# Patient Record
Sex: FEMALE | Race: WHITE | URBAN - METROPOLITAN AREA
[De-identification: names, ages, dates, MRNs, and addresses within clinical notes are randomized per-mention and may not be internally consistent; named-entity substitution may affect disease eponyms.]

---

## 2021-11-22 ENCOUNTER — OFFICE VISIT (OUTPATIENT)
Dept: NEUROLOGY | Facility: MEDICAL CENTER | Age: 46
End: 2021-11-22
Attending: PSYCHIATRY & NEUROLOGY
Payer: COMMERCIAL

## 2021-11-22 VITALS
WEIGHT: 156.97 LBS | OXYGEN SATURATION: 97 % | HEIGHT: 62 IN | SYSTOLIC BLOOD PRESSURE: 148 MMHG | DIASTOLIC BLOOD PRESSURE: 98 MMHG | RESPIRATION RATE: 12 BRPM | HEART RATE: 86 BPM | TEMPERATURE: 97.3 F | BODY MASS INDEX: 28.89 KG/M2

## 2021-11-22 DIAGNOSIS — E28.2 PCOS (POLYCYSTIC OVARIAN SYNDROME): ICD-10-CM

## 2021-11-22 DIAGNOSIS — N80.9 ENDOMETRIOSIS: ICD-10-CM

## 2021-11-22 DIAGNOSIS — I10 HYPERTENSION, UNSPECIFIED TYPE: ICD-10-CM

## 2021-11-22 DIAGNOSIS — K58.0 IRRITABLE BOWEL SYNDROME WITH DIARRHEA: ICD-10-CM

## 2021-11-22 DIAGNOSIS — G43.111 INTRACTABLE MIGRAINE WITH AURA WITH STATUS MIGRAINOSUS: ICD-10-CM

## 2021-11-22 DIAGNOSIS — F32.9 REACTIVE DEPRESSION: ICD-10-CM

## 2021-11-22 DIAGNOSIS — M32.8 OTHER FORMS OF SYSTEMIC LUPUS ERYTHEMATOSUS, UNSPECIFIED ORGAN INVOLVEMENT STATUS (HCC): ICD-10-CM

## 2021-11-22 DIAGNOSIS — N32.81 OVERACTIVE BLADDER: ICD-10-CM

## 2021-11-22 DIAGNOSIS — G35 MULTIPLE SCLEROSIS (HCC): ICD-10-CM

## 2021-11-22 PROBLEM — K58.2 IRRITABLE BOWEL SYNDROME WITH BOTH CONSTIPATION AND DIARRHEA: Status: ACTIVE | Noted: 2021-11-22

## 2021-11-22 PROCEDURE — 99212 OFFICE O/P EST SF 10 MIN: CPT | Performed by: PSYCHIATRY & NEUROLOGY

## 2021-11-22 PROCEDURE — 99205 OFFICE O/P NEW HI 60 MIN: CPT | Performed by: PSYCHIATRY & NEUROLOGY

## 2021-11-22 RX ORDER — DEXTROAMPHETAMINE SACCHARATE, AMPHETAMINE ASPARTATE, DEXTROAMPHETAMINE SULFATE AND AMPHETAMINE SULFATE 1.25; 1.25; 1.25; 1.25 MG/1; MG/1; MG/1; MG/1
5 TABLET ORAL 2 TIMES DAILY
Qty: 60 TABLET | Refills: 0 | Status: SHIPPED | OUTPATIENT
Start: 2021-11-22 | End: 2021-11-29 | Stop reason: SDUPTHER

## 2021-11-22 RX ORDER — RENAGEL 800 MG/1
TABLET ORAL
COMMUNITY
Start: 2019-01-01 | End: 2022-08-08 | Stop reason: SDUPTHER

## 2021-11-22 RX ORDER — TIZANIDINE 2 MG/1
TABLET ORAL
COMMUNITY
Start: 2020-01-01 | End: 2022-02-07

## 2021-11-22 RX ORDER — SUMATRIPTAN 100 MG/1
100 TABLET, FILM COATED ORAL
Qty: 10 TABLET | Refills: 5 | Status: SHIPPED | OUTPATIENT
Start: 2021-11-22 | End: 2021-12-22

## 2021-11-22 RX ORDER — SUMATRIPTAN 100 MG/1
TABLET, FILM COATED ORAL
COMMUNITY
Start: 2019-01-01 | End: 2021-11-22 | Stop reason: SDUPTHER

## 2021-11-22 RX ORDER — TOPIRAMATE SPINKLE 25 MG/1
25 CAPSULE ORAL 2 TIMES DAILY
Qty: 60 CAPSULE | Refills: 3 | Status: SHIPPED | OUTPATIENT
Start: 2021-11-22 | End: 2022-06-07

## 2021-11-22 RX ORDER — TOPIRAMATE SPINKLE 25 MG/1
25 CAPSULE ORAL 2 TIMES DAILY
Qty: 60 CAPSULE | Refills: 3 | Status: SHIPPED | OUTPATIENT
Start: 2021-11-22 | End: 2022-04-15

## 2021-11-22 RX ORDER — FLUOXETINE HYDROCHLORIDE 40 MG/1
CAPSULE ORAL
COMMUNITY
Start: 2017-09-01 | End: 2022-02-07 | Stop reason: SDUPTHER

## 2021-11-22 RX ORDER — HYDROXYZINE PAMOATE 25 MG/1
CAPSULE ORAL
COMMUNITY
End: 2022-06-07 | Stop reason: SDUPTHER

## 2021-11-22 RX ORDER — ONDANSETRON 4 MG/1
TABLET, FILM COATED ORAL
COMMUNITY
Start: 2017-09-01 | End: 2022-06-07

## 2021-11-22 RX ORDER — TIZANIDINE 4 MG/1
4 TABLET ORAL EVERY 6 HOURS PRN
Qty: 30 TABLET | Refills: 3 | Status: SHIPPED | OUTPATIENT
Start: 2021-11-22 | End: 2022-03-22

## 2021-11-22 RX ORDER — SODIUM FLUORIDE 5 MG/G
GEL, DENTIFRICE DENTAL
COMMUNITY
Start: 2021-09-01 | End: 2023-12-19

## 2021-11-22 ASSESSMENT — PATIENT HEALTH QUESTIONNAIRE - PHQ9: CLINICAL INTERPRETATION OF PHQ2 SCORE: 0

## 2021-11-22 NOTE — ASSESSMENT & PLAN NOTE
Pt has had some recent increase in BP and she has had some weight gain. She has gained 10 pounds because of many life changes. Pt is eating less and she still is gaining weight.

## 2021-11-22 NOTE — ASSESSMENT & PLAN NOTE
Pt states she had rashes on her cheeks and she gets lesions in her eyes and she takes eye drops. Pt states she gets joint pain in her right elbow and her thumb joint pain and swelling. Pt has swelling her toes.
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
## 2021-11-22 NOTE — ASSESSMENT & PLAN NOTE
Pt started in her late teens. Pt was in the injectable imitrex study. Pt tries not to take it too much. She starts with advil. Pt feels she is immune to the triptan. Pt states that she has some left eye pain. Pt states she has HA on both sides. Pt has 3 cups of coffee a day. Pt states she wants to try topamax as they may have helped in the past for about 2 weeks.

## 2021-11-23 NOTE — ASSESSMENT & PLAN NOTE
Pt has had symptoms of diarrhea and she has had issues with this for a long time. She has had a spastic colon and she has had some incontinence.

## 2021-11-23 NOTE — ASSESSMENT & PLAN NOTE
Pt states that she has had some issues and she did some pelvic floor PT. Pt had chronic UTI in the past.

## 2021-11-23 NOTE — ASSESSMENT & PLAN NOTE
Pt states that she had symptoms at age 14 when her period started and she states she had a hysterectomy when she was 2013. Pt states she had a lot of relief since the hysterectomy.
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
## 2021-11-29 DIAGNOSIS — G35 MULTIPLE SCLEROSIS (HCC): ICD-10-CM

## 2021-11-29 RX ORDER — DEXTROAMPHETAMINE SACCHARATE, AMPHETAMINE ASPARTATE, DEXTROAMPHETAMINE SULFATE AND AMPHETAMINE SULFATE 1.25; 1.25; 1.25; 1.25 MG/1; MG/1; MG/1; MG/1
5 TABLET ORAL 2 TIMES DAILY
Qty: 60 TABLET | Refills: 0 | Status: SHIPPED | OUTPATIENT
Start: 2021-11-29 | End: 2021-12-29

## 2021-12-13 ENCOUNTER — OFFICE VISIT (OUTPATIENT)
Dept: URGENT CARE | Facility: CLINIC | Age: 46
End: 2021-12-13
Payer: COMMERCIAL

## 2021-12-13 ENCOUNTER — APPOINTMENT (OUTPATIENT)
Dept: RADIOLOGY | Facility: IMAGING CENTER | Age: 46
End: 2021-12-13
Attending: STUDENT IN AN ORGANIZED HEALTH CARE EDUCATION/TRAINING PROGRAM
Payer: COMMERCIAL

## 2021-12-13 VITALS
WEIGHT: 154 LBS | BODY MASS INDEX: 28.34 KG/M2 | DIASTOLIC BLOOD PRESSURE: 96 MMHG | SYSTOLIC BLOOD PRESSURE: 136 MMHG | RESPIRATION RATE: 20 BRPM | HEART RATE: 97 BPM | OXYGEN SATURATION: 97 % | HEIGHT: 62 IN | TEMPERATURE: 97.8 F

## 2021-12-13 DIAGNOSIS — R05.9 COUGH: ICD-10-CM

## 2021-12-13 DIAGNOSIS — I10 HYPERTENSION, UNSPECIFIED TYPE: ICD-10-CM

## 2021-12-13 DIAGNOSIS — J98.01 BRONCHOSPASM: ICD-10-CM

## 2021-12-13 DIAGNOSIS — J06.9 VIRAL URI WITH COUGH: ICD-10-CM

## 2021-12-13 PROCEDURE — 71046 X-RAY EXAM CHEST 2 VIEWS: CPT | Mod: TC,FY | Performed by: RADIOLOGY

## 2021-12-13 PROCEDURE — 99204 OFFICE O/P NEW MOD 45 MIN: CPT | Performed by: STUDENT IN AN ORGANIZED HEALTH CARE EDUCATION/TRAINING PROGRAM

## 2021-12-13 RX ORDER — ALBUTEROL SULFATE 90 UG/1
2 AEROSOL, METERED RESPIRATORY (INHALATION) EVERY 6 HOURS PRN
Qty: 8.5 G | Refills: 0 | Status: SHIPPED | OUTPATIENT
Start: 2021-12-13 | End: 2022-02-07

## 2021-12-13 RX ORDER — CETIRIZINE HYDROCHLORIDE 10 MG/1
10 TABLET ORAL DAILY
Qty: 30 TABLET | Refills: 1 | Status: SHIPPED | OUTPATIENT
Start: 2021-12-13 | End: 2022-08-18 | Stop reason: SDUPTHER

## 2021-12-13 RX ORDER — INHALER, ASSIST DEVICES
1 SPACER (EA) MISCELLANEOUS ONCE
Qty: 1 EACH | Refills: 0 | Status: SHIPPED | OUTPATIENT
Start: 2021-12-13 | End: 2021-12-13

## 2021-12-13 RX ORDER — MOMETASONE FUROATE 50 UG/1
2 SPRAY, METERED NASAL
Qty: 1 EACH | Refills: 1 | Status: SHIPPED | OUTPATIENT
Start: 2021-12-13 | End: 2022-02-07

## 2021-12-13 NOTE — PROGRESS NOTES
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 female who presents with a chief complaint of dry cough, runny nose, sinus headache and bilateral earache which started 1 week ago.  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  She has tried NyQuil and DayQuil which did not help.  Positive ROS for wheezing and shortness of breath.  No fevers, chills or body aches.  PMH of pneumonia.  No history of asthma.  She is not immunized against Covid.  She has had a negative rapid and negative PCR test since onset of symptoms.    REVIEW OF SYSTEMS  General: no fever or chills  GI: no nausea or vomiting  See HPI for further details.    PAST MEDICAL HISTORY  Patient Active Problem List    Diagnosis Date Noted   • Multiple sclerosis (HCC) 11/22/2021   • Intractable migraine with aura with status migrainosus 11/22/2021   • Hypertension 11/22/2021   • PCOS (polycystic ovarian syndrome) 11/22/2021   • Endometriosis 11/22/2021   • Other forms of systemic lupus erythematosus (HCC) 11/22/2021   • Irritable bowel syndrome with diarrhea 11/22/2021   • Overactive bladder 11/22/2021   • Reactive depression 11/22/2021       SURGICAL HISTORY   has a past surgical history that includes abdominal hysterectomy total (2013) and appendectomy (1998).    ALLERGIES  Allergies   Allergen Reactions   • Ampicillin    • Erythromycin    • Hydrocodone-Acetaminophen    • Tape    • Tramadol        CURRENT MEDICATIONS  Home Medications    **Home medications have not yet been reviewed for this encounter**         SOCIAL HISTORY  Social History     Tobacco Use   •  "Smoking status: Former Smoker     Quit date:      Years since quittin.9   • Smokeless tobacco: Never Used   Vaping Use   • Vaping Use: Every day   • Substances: THC   Substance and Sexual Activity   • Alcohol use: Yes     Comment: 4-5 per week   • Drug use: Yes     Types: Marijuana   • Sexual activity: Not on file       FAMILY HISTORY  Family History   Problem Relation Age of Onset   • Hypertension Mother    • Hyperlipidemia Mother    • Lupus Paternal Grandmother           Objective:   PHYSICAL EXAM  VITAL SIGNS: /96   Pulse 97   Temp 36.6 °C (97.8 °F) (Temporal)   Resp 20   Ht 1.575 m (5' 2\")   Wt 69.9 kg (154 lb)   SpO2 97%   Breastfeeding No   BMI 28.17 kg/m²     Gen: no acute distress, normal voice  Skin: dry, intact, moist mucosal membranes  ENT: Bilateral TMs mildly erythematous but intact.  Lungs: Lungs were tight with inspiration but good air intake.  No wheezing, rhonchi or crackles.    CV: RRR w/o murmurs or clicks  Psych: normal affect, normal judgement, alert, awake      RADIOLOGY RESULTS   DX-CHEST-2 VIEWS    Result Date: 2021 12:53 PM HISTORY/REASON FOR EXAM:  Cough. TECHNIQUE/EXAM DESCRIPTION AND NUMBER OF VIEWS: Two views of the chest. COMPARISON:  None. FINDINGS: LUNGS: The lungs are clear. HEART and MEDIASTINUM: normal in size. Pleura: There are no pleural effusion or pneumothoraces. Osseous structures: No significant bony abnormality.     Negative two views of the chest.             Assessment/Plan:     1. Cough  DX-CHEST-2 VIEWS   2. Bronchospasm     3. Viral URI with cough     1-3) suspect symptoms secondary to underlying viral URI with subsequent bronchospasms.  Patient is not immunized against Covid but has had 2 tests this week.  She does report a previous history of PNA and a colleague of hers at work said he heard crackles on examination so I ordered a chest x-ray which was unremarkable.  Patient's lungs were clear to auscultation on my examination.  " She is afebrile with an oxygen saturation 97%.  Discussed trial symptomatic treatment and return precautions  -Ordered Nasonex  -Ordered Zyrtec  -Ordered albuterol  -If symptoms worsen or do not improve follow-up in urgent care    4) blood pressure elevated today at 136/96.  Likely secondary to above.  Follow-up with primary care for reevaluation continue management.        Differential diagnosis, natural history, supportive care, and indications for immediate follow-up discussed. All questions answered. Patient agrees with the plan of care.    Follow-up as needed if symptoms worsen or fail to improve to PCP, Urgent care or Emergency Room.    Please note that this dictation was created using voice recognition software. I have made a reasonable attempt to correct obvious errors, but I expect that there are errors of grammar and possibly content that I did not discover before finalizing the note.

## 2021-12-21 DIAGNOSIS — G43.111 INTRACTABLE MIGRAINE WITH AURA WITH STATUS MIGRAINOSUS: ICD-10-CM

## 2021-12-22 RX ORDER — SUMATRIPTAN 100 MG/1
TABLET, FILM COATED ORAL
Qty: 10 TABLET | Refills: 5 | Status: SHIPPED | OUTPATIENT
Start: 2021-12-22 | End: 2022-06-07 | Stop reason: SDUPTHER

## 2022-02-07 ENCOUNTER — OFFICE VISIT (OUTPATIENT)
Dept: NEUROLOGY | Facility: MEDICAL CENTER | Age: 47
End: 2022-02-07
Attending: PSYCHIATRY & NEUROLOGY
Payer: COMMERCIAL

## 2022-02-07 VITALS
WEIGHT: 154 LBS | HEART RATE: 76 BPM | TEMPERATURE: 97.7 F | SYSTOLIC BLOOD PRESSURE: 134 MMHG | BODY MASS INDEX: 28.34 KG/M2 | DIASTOLIC BLOOD PRESSURE: 82 MMHG | OXYGEN SATURATION: 97 % | HEIGHT: 62 IN

## 2022-02-07 DIAGNOSIS — G43.111 INTRACTABLE MIGRAINE WITH AURA WITH STATUS MIGRAINOSUS: ICD-10-CM

## 2022-02-07 DIAGNOSIS — G35 MULTIPLE SCLEROSIS (HCC): ICD-10-CM

## 2022-02-07 PROCEDURE — 99214 OFFICE O/P EST MOD 30 MIN: CPT | Performed by: PSYCHIATRY & NEUROLOGY

## 2022-02-07 PROCEDURE — 99212 OFFICE O/P EST SF 10 MIN: CPT | Performed by: PSYCHIATRY & NEUROLOGY

## 2022-02-07 RX ORDER — FLUOXETINE HYDROCHLORIDE 40 MG/1
CAPSULE ORAL
Qty: 90 CAPSULE | Refills: 3 | Status: SHIPPED | OUTPATIENT
Start: 2022-02-07 | End: 2022-06-07 | Stop reason: SDUPTHER

## 2022-02-07 RX ORDER — DEXTROAMPHETAMINE SACCHARATE, AMPHETAMINE ASPARTATE MONOHYDRATE, DEXTROAMPHETAMINE SULFATE AND AMPHETAMINE SULFATE 3.75; 3.75; 3.75; 3.75 MG/1; MG/1; MG/1; MG/1
15 CAPSULE, EXTENDED RELEASE ORAL EVERY MORNING
Qty: 30 CAPSULE | Refills: 0 | Status: SHIPPED | OUTPATIENT
Start: 2022-02-07 | End: 2022-03-15 | Stop reason: SDUPTHER

## 2022-02-07 NOTE — ASSESSMENT & PLAN NOTE
Pt has daily migraine headache for the last 2 weeks.  We discussed treatment options and the medication she is on we also discussed environmental factors for her headaches.  She has recently started the Metformin which could be related.

## 2022-02-08 NOTE — ASSESSMENT & PLAN NOTE
Pt with several episodes of feeling paresthesias throughout her body and face.  We reviewed recent MRI scans of the brain and the spine and none of the lesions correlate with that symptom.  And concerned that it could be medication side effect.

## 2022-02-08 NOTE — PROGRESS NOTES
Chief Complaint   Patient presents with   • Follow-Up     MS        Problem List Items Addressed This Visit     Multiple sclerosis (HCC)     Pt with several episodes of feeling paresthesias throughout her body and face.  We reviewed recent MRI scans of the brain and the spine and none of the lesions correlate with that symptom.  And concerned that it could be medication side effect.         Relevant Medications    amphetamine-dextroamphetamine (ADDERALL XR, 15MG,) 15 MG XR capsule    fluoxetine (PROZAC) 40 MG capsule    Intractable migraine with aura with status migrainosus     Pt has daily migraine headache for the last 2 weeks.  We discussed treatment options and the medication she is on we also discussed environmental factors for her headaches.  She has recently started the Metformin which could be related.         Relevant Medications    fluoxetine (PROZAC) 40 MG capsule          History of present illness:  Della Mata 46 y.o. female presents today for treatment of migraines and multiple sclerosis.    Past medical history:   Past Medical History:   Diagnosis Date   • Head ache     migraines   • Lupus (HCC)    • Multiple sclerosis (HCC)        Past surgical history:   Past Surgical History:   Procedure Laterality Date   • ABDOMINAL HYSTERECTOMY TOTAL     • APPENDECTOMY         Family history:   Family History   Problem Relation Age of Onset   • Hypertension Mother    • Hyperlipidemia Mother    • Lupus Paternal Grandmother        Social history:   Social History     Socioeconomic History   • Marital status: Other     Spouse name: Not on file   • Number of children: Not on file   • Years of education: Not on file   • Highest education level: Not on file   Occupational History   • Not on file   Tobacco Use   • Smoking status: Former Smoker     Quit date:      Years since quittin.1   • Smokeless tobacco: Never Used   Vaping Use   • Vaping Use: Every day   • Substances: THC   Substance and Sexual  Activity   • Alcohol use: Yes     Comment: 4-5 per week   • Drug use: Yes     Types: Marijuana   • Sexual activity: Not on file   Other Topics Concern   • Not on file   Social History Narrative   • Not on file     Social Determinants of Health     Financial Resource Strain:    • Difficulty of Paying Living Expenses: Not on file   Food Insecurity:    • Worried About Running Out of Food in the Last Year: Not on file   • Ran Out of Food in the Last Year: Not on file   Transportation Needs:    • Lack of Transportation (Medical): Not on file   • Lack of Transportation (Non-Medical): Not on file   Physical Activity:    • Days of Exercise per Week: Not on file   • Minutes of Exercise per Session: Not on file   Stress:    • Feeling of Stress : Not on file   Social Connections:    • Frequency of Communication with Friends and Family: Not on file   • Frequency of Social Gatherings with Friends and Family: Not on file   • Attends Yarsani Services: Not on file   • Active Member of Clubs or Organizations: Not on file   • Attends Club or Organization Meetings: Not on file   • Marital Status: Not on file   Intimate Partner Violence:    • Fear of Current or Ex-Partner: Not on file   • Emotionally Abused: Not on file   • Physically Abused: Not on file   • Sexually Abused: Not on file   Housing Stability:    • Unable to Pay for Housing in the Last Year: Not on file   • Number of Places Lived in the Last Year: Not on file   • Unstable Housing in the Last Year: Not on file       Current medications:   Current Outpatient Medications   Medication   • metFORMIN (GLUCOPHAGE) 500 MG Tab   • amphetamine-dextroamphetamine (ADDERALL XR, 15MG,) 15 MG XR capsule   • fluoxetine (PROZAC) 40 MG capsule   • sumatriptan (IMITREX) 100 MG tablet   • cetirizine (ZYRTEC) 10 MG Tab   • hydrOXYzine pamoate (VISTARIL) 25 MG Cap   • ondansetron (ZOFRAN) 4 MG Tab tablet   • PREVIDENT 1.1 % Gel   • Teriflunomide (AUBAGIO) 14 MG Tab   • tizanidine (ZANAFLEX)  4 MG Tab   • topiramate (TOPAMAX) 25 MG capsule   • topiramate (TOPAMAX) 25 MG capsule     No current facility-administered medications for this visit.       Medication Allergy:  Allergies   Allergen Reactions   • Ampicillin    • Erythromycin    • Hydrocodone-Acetaminophen    • Tape    • Tramadol        Review of systems:   Constitutional: denies fever, night sweats, weight loss.   Eyes: denies acute vision change, eye pain or secretion.   Ears, Nose, Mouth, Throat: denies nasal secretion, nasal bleeding, difficulty swallowing, hearing loss, tinnitus, vertigo, ear pain, acute dental problems, oral ulcers or lesions.   Endocrine: denies recent weight changes, heat or cold intolerance, polyuria, polydypsia, polyphagia,abnormal hair growth.  Cardiovascular: denies new onset of chest pain, palpitations, syncope, or dyspnea of exertion.  Pulmonary: denies shortness of breath, new onset of cough, hemoptysis, wheezing, chest pain or flu-like symptoms.   GI: denies nausea, vomiting, diarrhea, GI bleeding, change in appetite, abdominal pain, and change in bowel habits.  : denies dysuria, urinary incontinence, hematuria.  Heme/oncology: denies history of easy bruising or bleeding. No history of cancer, DVTor PE.  Allergy/immunology: denies hives/urticaria, or itching.   Dermatologic: denies new rash, or new skin lesions.  Musculoskeletal:denies joint swelling or pain, muscle pain, neck and back pain. Neurologic: denies headaches, acute visual changes, facial droopiness, muscle weakness (focal or generalized), paresthesias, anesthesia, ataxia, change in speech or language, memory loss, abnormal movements, seizures, loss of consciousness, or episodes of confusion.   Psychiatric: denies symptoms of depression, anxiety, hallucinations, mood swings or changes, suicidal or homicidal thoughts.     Physical examination:   Vitals:    02/07/22 1525   BP: 134/82   BP Location: Right arm   Patient Position: Sitting   BP Cuff Size:  "Adult   Pulse: 76   Temp: 36.5 °C (97.7 °F)   TempSrc: Temporal   SpO2: 97%   Weight: 69.9 kg (154 lb)   Height: 1.575 m (5' 2\")     General: Patient in no acute distress, pleasant and cooperative.  HEENT: Normocephalic, no signs of acute trauma.   Neck: supple, no meningeal signs or carotid bruits. There is normal range of motion. No tenderness on exam.   Chest: clear to auscultation. No cough.   CV: RRR, no murmurs.   Skin: no signs of acute rashes or trauma.   Musculoskeletal: joints exhibit full range of motion, without any pain to palpation. There are no signs of joint or muscle swelling. There is no tenderness to deep palpation of muscles.   Psychiatric: No hallucinatory behavior. Denies symptoms of depression or suicidal ideation. Mood and affect appear normal on exam.     NEUROLOGICAL EXAM:   Mental status, orientation: Awake, alert and fully oriented.   Speech and language: speech is clear and fluent. The patient is able to name, repeat and comprehend.   Memory: There is intact recollection of recent and remote events.   Cranial nerve exam: Pupils are 3-4 mm bilaterally and equally reactive to light and accommodation. Visual fields are intact by confrontation. Fundoscopic exam was unremarkable. There is no nystagmus on primary or secondary gaze. Intact full EOM in all directions of gaze. Face appears symmetric. Sensation in the face is intact to light touch. Uvula is midline. Palate elevates symmetrically. Tongue is midline and without any signs of tongue biting or fasciculations. Sternocleidomastoid muscles exhibit is normal strength bilaterally. Shoulder shrug is intact bilaterally.   Motor exam: Strength is 5/5 in all extremities. Tone is normal. No abnormal movements were seen on exam.   Sensory exam reveals normal sense of light touch, proprioception, vibration and pinprick in all extremities.   Deep tendon reflexes:  2+ throughout. Plantar responses are flexor. There is no clonus.   Coordination: " shows a normal finger-nose-finger. Normal rapidly alternating movements.   Gait: The patient was able to get up from seated position on first attempt without requiring assistance. Found to be steady when walking. Movements were fluid with normal arm swing. The patient was able to turn without difficulties or tendency to fall. Romberg examination positive      ANCILLARY DATA REVIEWED:     Lab Data Review:  No results found for this or any previous visit (from the past 24 hour(s)).    Records reviewed: I reviewed laboratory studies done which did show high AST and high ALT      Imaging: Reviewed last imaging studies done at [facility].          ASSESSMENT AND PLAN:    1. Intractable migraine with aura with status migrainosus  Plan to stop the Metformin refer to Rhonda Mckeon at [facility] for treatment of headaches with manual PT.    2. Multiple sclerosis (HCC)  Plan to try long-acting amphetamine.  Patient is going to keep a calendar of any new events that could be associated with MS versus medication side effect.  Following medications filled for MS as below.  Plan to continue Aubagio and patient will be due for labs but I would like her gastroenterologist evaluation to be done prior to ordering these new labs.  We discussed importance of diet and exercise in relationship to both her liver function and her multiple sclerosis.  - amphetamine-dextroamphetamine (ADDERALL XR, 15MG,) 15 MG XR capsule; Take 1 Capsule by mouth every morning for 30 days.  Dispense: 30 Capsule; Refill: 0  - fluoxetine (PROZAC) 40 MG capsule; fluoxetine 40 mg capsule daily  Dispense: 90 Capsule; Refill: 3        FOLLOW-UP:   No follow-ups on file.      My total time spent caring for the patient on the day of the encounter was 38 minutes.   This does not include time spent on separately billable procedures/tests.  EDUCATION AND COUNSELING:  -Discussed regular exercise program and prevention of cardiovascular disease, including stroke.    -Discussed healthy lifestyle, including: healthy diet (rich in fruits, vegetables, nuts and healthy oils); proper hydration, and adequate sleep hygiene (allowing 7-8 hrs of overnight sleep).        Melissa Bloch, MD  Clinical  of Neurology [facility].   Diplomate in Neurology.   Office: [phone]  Fax: [phone]

## 2022-03-15 DIAGNOSIS — G35 MULTIPLE SCLEROSIS (HCC): ICD-10-CM

## 2022-03-15 RX ORDER — DEXTROAMPHETAMINE SACCHARATE, AMPHETAMINE ASPARTATE MONOHYDRATE, DEXTROAMPHETAMINE SULFATE AND AMPHETAMINE SULFATE 3.75; 3.75; 3.75; 3.75 MG/1; MG/1; MG/1; MG/1
15 CAPSULE, EXTENDED RELEASE ORAL EVERY MORNING
Qty: 30 CAPSULE | Refills: 0 | Status: SHIPPED | OUTPATIENT
Start: 2022-03-15 | End: 2022-03-16 | Stop reason: SDUPTHER

## 2022-03-15 NOTE — TELEPHONE ENCOUNTER
Received request via: Pharmacy    Was the patient seen in the last year in this department? Yes    LV  2/7/22  [facility]  6/7/22    Does the patient have an active prescription (recently filled or refills available) for medication(s) requested? yes

## 2022-03-16 DIAGNOSIS — G35 MULTIPLE SCLEROSIS (HCC): ICD-10-CM

## 2022-03-16 RX ORDER — DEXTROAMPHETAMINE SACCHARATE, AMPHETAMINE ASPARTATE MONOHYDRATE, DEXTROAMPHETAMINE SULFATE AND AMPHETAMINE SULFATE 3.75; 3.75; 3.75; 3.75 MG/1; MG/1; MG/1; MG/1
15 CAPSULE, EXTENDED RELEASE ORAL EVERY MORNING
Qty: 30 CAPSULE | Refills: 0 | Status: SHIPPED | OUTPATIENT
Start: 2022-03-16 | End: 2022-04-20 | Stop reason: SDUPTHER

## 2022-03-21 DIAGNOSIS — G35 MULTIPLE SCLEROSIS (HCC): ICD-10-CM

## 2022-03-22 RX ORDER — TIZANIDINE 4 MG/1
4 TABLET ORAL EVERY 6 HOURS PRN
Qty: 30 TABLET | Refills: 3 | Status: SHIPPED | OUTPATIENT
Start: 2022-03-22 | End: 2022-06-07 | Stop reason: SDUPTHER

## 2022-04-14 DIAGNOSIS — G43.111 INTRACTABLE MIGRAINE WITH AURA WITH STATUS MIGRAINOSUS: ICD-10-CM

## 2022-04-15 RX ORDER — TOPIRAMATE SPINKLE 25 MG/1
CAPSULE ORAL
Qty: 60 CAPSULE | Refills: 3 | Status: SHIPPED | OUTPATIENT
Start: 2022-04-15 | End: 2022-06-07

## 2022-04-20 DIAGNOSIS — G35 MULTIPLE SCLEROSIS (HCC): ICD-10-CM

## 2022-04-21 RX ORDER — DEXTROAMPHETAMINE SACCHARATE, AMPHETAMINE ASPARTATE MONOHYDRATE, DEXTROAMPHETAMINE SULFATE AND AMPHETAMINE SULFATE 3.75; 3.75; 3.75; 3.75 MG/1; MG/1; MG/1; MG/1
15 CAPSULE, EXTENDED RELEASE ORAL EVERY MORNING
Qty: 30 CAPSULE | Refills: 0 | Status: SHIPPED | OUTPATIENT
Start: 2022-04-21 | End: 2022-05-18 | Stop reason: SDUPTHER

## 2022-05-18 DIAGNOSIS — G35 MULTIPLE SCLEROSIS (HCC): ICD-10-CM

## 2022-05-24 RX ORDER — DEXTROAMPHETAMINE SACCHARATE, AMPHETAMINE ASPARTATE MONOHYDRATE, DEXTROAMPHETAMINE SULFATE AND AMPHETAMINE SULFATE 3.75; 3.75; 3.75; 3.75 MG/1; MG/1; MG/1; MG/1
15 CAPSULE, EXTENDED RELEASE ORAL EVERY MORNING
Qty: 30 CAPSULE | Refills: 0 | Status: SHIPPED | OUTPATIENT
Start: 2022-05-24 | End: 2022-06-21 | Stop reason: SDUPTHER

## 2022-05-24 NOTE — TELEPHONE ENCOUNTER
Received request via: Pharmacy    Was the patient seen in the last year in this department? Yes    LV: 2/7/22   [facility]: 6/7/22    Does the patient have an active prescription (recently filled or refills available) for medication(s) requested? No

## 2022-06-07 ENCOUNTER — OFFICE VISIT (OUTPATIENT)
Dept: NEUROLOGY | Facility: MEDICAL CENTER | Age: 47
End: 2022-06-07
Attending: PSYCHIATRY & NEUROLOGY
Payer: COMMERCIAL

## 2022-06-07 VITALS
OXYGEN SATURATION: 99 % | HEART RATE: 98 BPM | RESPIRATION RATE: 14 BRPM | BODY MASS INDEX: 27.75 KG/M2 | DIASTOLIC BLOOD PRESSURE: 80 MMHG | WEIGHT: 150.79 LBS | HEIGHT: 62 IN | SYSTOLIC BLOOD PRESSURE: 116 MMHG

## 2022-06-07 DIAGNOSIS — G43.111 INTRACTABLE MIGRAINE WITH AURA WITH STATUS MIGRAINOSUS: ICD-10-CM

## 2022-06-07 DIAGNOSIS — G35 MULTIPLE SCLEROSIS (HCC): ICD-10-CM

## 2022-06-07 PROCEDURE — 99212 OFFICE O/P EST SF 10 MIN: CPT | Performed by: PSYCHIATRY & NEUROLOGY

## 2022-06-07 PROCEDURE — 99213 OFFICE O/P EST LOW 20 MIN: CPT | Performed by: PSYCHIATRY & NEUROLOGY

## 2022-06-07 RX ORDER — TOPIRAMATE 50 MG/1
50 TABLET, FILM COATED ORAL 2 TIMES DAILY
Qty: 60 TABLET | Refills: 3 | Status: SHIPPED | OUTPATIENT
Start: 2022-06-07 | End: 2022-10-05 | Stop reason: SDUPTHER

## 2022-06-07 RX ORDER — ESTRADIOL 0.1 MG/G
CREAM VAGINAL
COMMUNITY
Start: 2022-05-13

## 2022-06-07 RX ORDER — ONDANSETRON 8 MG/1
8 TABLET, ORALLY DISINTEGRATING ORAL EVERY 8 HOURS PRN
Status: SHIPPED | OUTPATIENT
Start: 2022-06-07

## 2022-06-07 RX ORDER — FLUOXETINE HYDROCHLORIDE 40 MG/1
CAPSULE ORAL
Qty: 90 CAPSULE | Refills: 3 | Status: SHIPPED | OUTPATIENT
Start: 2022-06-07 | End: 2022-09-30 | Stop reason: SDUPTHER

## 2022-06-07 RX ORDER — SUMATRIPTAN 100 MG/1
100 TABLET, FILM COATED ORAL
Qty: 10 TABLET | Refills: 5 | Status: SHIPPED | OUTPATIENT
Start: 2022-06-07 | End: 2022-09-30 | Stop reason: SDUPTHER

## 2022-06-07 RX ORDER — HYDROXYZINE PAMOATE 25 MG/1
25 CAPSULE ORAL 3 TIMES DAILY PRN
Qty: 90 CAPSULE | Refills: 5 | Status: SHIPPED | OUTPATIENT
Start: 2022-06-07 | End: 2022-10-05 | Stop reason: SDUPTHER

## 2022-06-07 RX ORDER — CHOLECALCIFEROL (VITAMIN D3) 1250 MCG
CAPSULE ORAL
COMMUNITY
Start: 2022-05-13 | End: 2023-12-19

## 2022-06-07 RX ORDER — TIZANIDINE 4 MG/1
4 TABLET ORAL EVERY 6 HOURS PRN
Qty: 60 TABLET | Refills: 3 | Status: SHIPPED | OUTPATIENT
Start: 2022-06-07 | End: 2022-10-05 | Stop reason: SDUPTHER

## 2022-06-07 NOTE — ASSESSMENT & PLAN NOTE
Pt states that she has less frequency and the PT helped. She has not taken an Imitrex in 2 weeks. Pt is eating plant based and she is gluten free. Pt has changed the dairy has helped.

## 2022-06-07 NOTE — PROGRESS NOTES
Chief Complaint   Patient presents with   • Multiple Sclerosis       Problem List Items Addressed This Visit     Multiple sclerosis (HCC)     Pt states that she has ups and downs. Pt is ok            Relevant Medications    tizanidine (ZANAFLEX) 4 MG Tab    fluoxetine (PROZAC) 40 MG capsule    hydrOXYzine pamoate (VISTARIL) 25 MG Cap    ondansetron (ZOFRAN ODT) dispertab 8 mg    Other Relevant Orders    CBC WITH DIFFERENTIAL    Comp Metabolic Panel    Intractable migraine with aura with status migrainosus     Pt states that she has less frequency and the PT helped. She has not taken an Imitrex in 2 weeks. Pt is eating plant based and she is gluten free. Pt has changed the dairy has helped.           Relevant Medications    topiramate (TOPAMAX) 50 MG tablet    sumatriptan (IMITREX) 100 MG tablet    tizanidine (ZANAFLEX) 4 MG Tab    fluoxetine (PROZAC) 40 MG capsule    ondansetron (ZOFRAN ODT) dispertab 8 mg          History of present illness:  Della Mata 46 y.o. female presents today for treatment of migraine headaches and multiple sclerosis.  She has been on a healthier diet and exercise program and is feeling better.    Past medical history:   Past Medical History:   Diagnosis Date   • Head ache     migraines   • Lupus (HCC)    • Multiple sclerosis (HCC)        Past surgical history:   Past Surgical History:   Procedure Laterality Date   • ABDOMINAL HYSTERECTOMY TOTAL  2013   • APPENDECTOMY  1998       Family history:   Family History   Problem Relation Age of Onset   • Hypertension Mother    • Hyperlipidemia Mother    • Lupus Paternal Grandmother        Social history:   Social History     Socioeconomic History   • Marital status: Single     Spouse name: Not on file   • Number of children: Not on file   • Years of education: Not on file   • Highest education level: Not on file   Occupational History   • Not on file   Tobacco Use   • Smoking status: Former Smoker     Quit date: 2014     Years since quitting:  8.4   • Smokeless tobacco: Never Used   Vaping Use   • Vaping Use: Every day   • Substances: THC   Substance and Sexual Activity   • Alcohol use: Yes     Comment: 4-5 per week   • Drug use: Yes     Types: Marijuana   • Sexual activity: Not on file   Other Topics Concern   • Not on file   Social History Narrative   • Not on file     Social Determinants of Health     Financial Resource Strain: Not on file   Food Insecurity: Not on file   Transportation Needs: Not on file   Physical Activity: Not on file   Stress: Not on file   Social Connections: Not on file   Intimate Partner Violence: Not on file   Housing Stability: Not on file       Current medications:   Current Outpatient Medications   Medication   • Cholecalciferol (VITAMIN D3) 1.25 MG (89487 UT) Cap   • estradiol (ESTRACE) 0.1 MG/GM vaginal cream   • topiramate (TOPAMAX) 50 MG tablet   • sumatriptan (IMITREX) 100 MG tablet   • tizanidine (ZANAFLEX) 4 MG Tab   • fluoxetine (PROZAC) 40 MG capsule   • hydrOXYzine pamoate (VISTARIL) 25 MG Cap   • amphetamine-dextroamphetamine (ADDERALL XR, 15MG,) 15 MG XR capsule   • cetirizine (ZYRTEC) 10 MG Tab   • PREVIDENT 1.1 % Gel   • Teriflunomide (AUBAGIO) 14 MG Tab     Current Facility-Administered Medications   Medication Dose   • ondansetron (ZOFRAN ODT) dispertab 8 mg  8 mg       Medication Allergy:  Allergies   Allergen Reactions   • Ampicillin    • Erythromycin    • Hydrocodone-Acetaminophen    • Tape    • Tramadol        Review of systems:   Constitutional: denies fever, night sweats, weight loss.   Eyes: denies acute vision change, eye pain or secretion.   Ears, Nose, Mouth, Throat: denies nasal secretion, nasal bleeding, difficulty swallowing, hearing loss, tinnitus, vertigo, ear pain, acute dental problems, oral ulcers or lesions.   Endocrine: denies recent weight changes, heat or cold intolerance, polyuria, polydypsia, polyphagia,abnormal hair growth.  Cardiovascular: denies new onset of chest pain,  "palpitations, syncope, or dyspnea of exertion.  Pulmonary: denies shortness of breath, new onset of cough, hemoptysis, wheezing, chest pain or flu-like symptoms.   GI: denies nausea, vomiting, diarrhea, GI bleeding, change in appetite, abdominal pain, and change in bowel habits.  : denies dysuria, urinary incontinence, hematuria.  Heme/oncology: denies history of easy bruising or bleeding. No history of cancer, DVTor PE.  Allergy/immunology: denies hives/urticaria, or itching.   Dermatologic: denies new rash, or new skin lesions.  Musculoskeletal:denies joint swelling or pain, muscle pain, neck and back pain. Neurologic: denies headaches, acute visual changes, facial droopiness, muscle weakness (focal or generalized), paresthesias, anesthesia, ataxia, change in speech or language, memory loss, abnormal movements, seizures, loss of consciousness, or episodes of confusion.   Psychiatric: denies symptoms of depression, anxiety, hallucinations, mood swings or changes, suicidal or homicidal thoughts.     Physical examination:   Vitals:    06/07/22 1422   BP: 116/80   BP Location: Right arm   Patient Position: Sitting   BP Cuff Size: Adult   Pulse: 98   Resp: 14   SpO2: 99%   Weight: 68.4 kg (150 lb 12.7 oz)   Height: 1.575 m (5' 2\")     General: Patient in no acute distress, pleasant and cooperative.  HEENT: Normocephalic, no signs of acute trauma.   Neck: supple, no meningeal signs or carotid bruits. There is normal range of motion. No tenderness on exam.   Chest: clear to auscultation. No cough.   CV: RRR, no murmurs.   Skin: no signs of acute rashes or trauma.   Musculoskeletal: joints exhibit full range of motion, without any pain to palpation. There are no signs of joint or muscle swelling. There is no tenderness to deep palpation of muscles.   Psychiatric: No hallucinatory behavior. Denies symptoms of depression or suicidal ideation. Mood and affect appear normal on exam.     NEUROLOGICAL EXAM:   Mental status, " orientation: Awake, alert and fully oriented.   Speech and language: speech is clear and fluent. The patient is able to name, repeat and comprehend.   Memory: There is intact recollection of recent and remote events.   Cranial nerve exam: Pupils are 3-4 mm bilaterally and equally reactive to light and accommodation. Visual fields are intact by confrontation. Fundoscopic exam was unremarkable. There is no nystagmus on primary or secondary gaze. Intact full EOM in all directions of gaze. Face appears symmetric. Sensation in the face is intact to light touch. Uvula is midline. Palate elevates symmetrically. Tongue is midline and without any signs of tongue biting or fasciculations. Sternocleidomastoid muscles exhibit is normal strength bilaterally. Shoulder shrug is intact bilaterally.   Motor exam: Strength is 5/5 in all extremities. Tone is normal. No abnormal movements were seen on exam.   Sensory exam reveals normal sense of light touch, proprioception, vibration and pinprick in all extremities.   Deep tendon reflexes:  2+ throughout. Plantar responses are flexor. There is no clonus.   Coordination: shows a normal finger-nose-finger. Normal rapidly alternating movements.   Gait: The patient was able to get up from seated position on first attempt without requiring assistance. Found to be steady when walking. Movements were fluid with normal arm swing. The patient was able to turn without difficulties or tendency to fall. Romberg examination positive      ANCILLARY DATA REVIEWED:     Lab Data Review:  No results found for this or any previous visit (from the past 24 hour(s)).    Records reviewed: I reviewed last liver function tests were slightly elevated from December.      Imaging: I reviewed last imaging studies from [facility].  Images are consistent with multiple sclerosis.          ASSESSMENT AND PLAN:    1. Multiple sclerosis (HCC)  Plan to continue Aubagio and check CBC and liver function tests.  I will  refill her medications as below for symptomatic treatment of her multiple sclerosis at her new pharmacy.  - tizanidine (ZANAFLEX) 4 MG Tab; Take 1 Tablet by mouth every 6 hours as needed (muscle spasms).  Dispense: 60 Tablet; Refill: 3  - fluoxetine (PROZAC) 40 MG capsule; fluoxetine 40 mg capsule daily  Dispense: 90 Capsule; Refill: 3  - hydrOXYzine pamoate (VISTARIL) 25 MG Cap; Take 1 Capsule by mouth 3 times a day as needed for Itching.  Dispense: 90 Capsule; Refill: 5  - ondansetron (ZOFRAN ODT) dispertab 8 mg  - CBC WITH DIFFERENTIAL; Future  - Comp Metabolic Panel; Future    2. Intractable migraine with aura with status migrainosus  Plan to increase her topiramate to 25 mg in the morning and 50 mg in the p.m. and then ultimately to 50 mg twice daily.  She will take sumatriptan hand as a abortive agent.  - topiramate (TOPAMAX) 50 MG tablet; Take 1 Tablet by mouth 2 times a day.  Dispense: 60 Tablet; Refill: 3  - sumatriptan (IMITREX) 100 MG tablet; Take 1 Tablet by mouth one time as needed for Migraine for up to 1 dose.  Dispense: 10 Tablet; Refill: 5  - ondansetron (ZOFRAN ODT) dispertab 8 mg        FOLLOW-UP:   Return in about 6 months (around 12/7/2022).       I spent 21 minutes with this patient, over fifty percent was spent counseling patient on their condition, best management practices, reviewing test results and risks and benefits of treatment.    EDUCATION AND COUNSELING:  -Discussed regular exercise program and prevention of cardiovascular disease, including stroke.   -Discussed healthy lifestyle, including: healthy diet (rich in fruits, vegetables, nuts and healthy oils); proper hydration, and adequate sleep hygiene (allowing 7-8 hrs of overnight sleep).        Melissa Bloch, MD  Clinical  of Neurology [facility].   Diplomate in Neurology.   Office: [phone]  Fax: [phone]

## 2022-06-21 DIAGNOSIS — G35 MULTIPLE SCLEROSIS (HCC): ICD-10-CM

## 2022-06-22 DIAGNOSIS — R11.0 NAUSEA: ICD-10-CM

## 2022-06-23 RX ORDER — DEXTROAMPHETAMINE SACCHARATE, AMPHETAMINE ASPARTATE MONOHYDRATE, DEXTROAMPHETAMINE SULFATE AND AMPHETAMINE SULFATE 3.75; 3.75; 3.75; 3.75 MG/1; MG/1; MG/1; MG/1
15 CAPSULE, EXTENDED RELEASE ORAL EVERY MORNING
Qty: 30 CAPSULE | Refills: 0 | Status: SHIPPED | OUTPATIENT
Start: 2022-06-23 | End: 2022-07-20 | Stop reason: SDUPTHER

## 2022-06-23 RX ORDER — ONDANSETRON 4 MG/1
8 TABLET, FILM COATED ORAL EVERY 4 HOURS PRN
Qty: 20 TABLET | Refills: 3 | Status: SHIPPED | OUTPATIENT
Start: 2022-06-23 | End: 2022-10-05 | Stop reason: SDUPTHER

## 2022-07-06 ENCOUNTER — APPOINTMENT (OUTPATIENT)
Dept: RADIOLOGY | Facility: IMAGING CENTER | Age: 47
End: 2022-07-06
Attending: PHYSICIAN ASSISTANT
Payer: COMMERCIAL

## 2022-07-06 ENCOUNTER — OFFICE VISIT (OUTPATIENT)
Dept: URGENT CARE | Facility: PHYSICIAN GROUP | Age: 47
End: 2022-07-06
Payer: COMMERCIAL

## 2022-07-06 VITALS
HEART RATE: 88 BPM | OXYGEN SATURATION: 98 % | BODY MASS INDEX: 27.6 KG/M2 | TEMPERATURE: 96.7 F | RESPIRATION RATE: 14 BRPM | DIASTOLIC BLOOD PRESSURE: 90 MMHG | HEIGHT: 62 IN | WEIGHT: 150 LBS | SYSTOLIC BLOOD PRESSURE: 138 MMHG

## 2022-07-06 DIAGNOSIS — R05.9 COUGH: ICD-10-CM

## 2022-07-06 DIAGNOSIS — J40 BRONCHITIS: ICD-10-CM

## 2022-07-06 PROCEDURE — 71046 X-RAY EXAM CHEST 2 VIEWS: CPT | Mod: TC | Performed by: PHYSICIAN ASSISTANT

## 2022-07-06 PROCEDURE — 99214 OFFICE O/P EST MOD 30 MIN: CPT | Performed by: PHYSICIAN ASSISTANT

## 2022-07-06 RX ORDER — METHYLPREDNISOLONE 4 MG/1
TABLET ORAL
Qty: 21 TABLET | Refills: 0 | Status: SHIPPED | OUTPATIENT
Start: 2022-07-06 | End: 2023-12-19

## 2022-07-06 RX ORDER — DEXTROMETHORPHAN HYDROBROMIDE AND PROMETHAZINE HYDROCHLORIDE 15; 6.25 MG/5ML; MG/5ML
5 SYRUP ORAL EVERY 4 HOURS PRN
Qty: 120 ML | Refills: 0 | Status: SHIPPED
Start: 2022-07-06 | End: 2022-08-18

## 2022-07-07 NOTE — PROGRESS NOTES
"Subjective:   Della Mata is a 46 y.o. female who presents for Cough (X3 weeks, runny nose, body aches, covid test neg )      HPI  Patient is a 46-year-old female with a history of lupus and MS presents to the clinic with complaints of a cough onset approximately 3 weeks ago.  The cough has been persistent.  The cough is productive in the morning but mostly nonproductive.  She reports of some shortness of breath with coughing.  She took 3 at home COVID test which were all negative.  She has tried over-the-counter cough, cold, and allergy medications without any relief.  She tried an old albuterol inhaler without much relief.  No fevers above 100 F. Denies any chest pain, hemoptysis, vomiting. Vapes THC.       Medications:    • amphetamine-dextroamphetamine  • Aubagio Tabs  • cetirizine Tabs  • estradiol  • fluoxetine  • hydrOXYzine pamoate Caps  • ondansetron  • ondansetron Tabs  • PreviDent Gel  • sumatriptan  • tizanidine Tabs  • topiramate  • Vitamin D3 Caps    Allergies: Ampicillin, Erythromycin, Hydrocodone-acetaminophen, Tape, and Tramadol    Problem List: Della Mata does not have any pertinent problems on file.    Surgical History:  Past Surgical History:   Procedure Laterality Date   • ABDOMINAL HYSTERECTOMY TOTAL  2013   • APPENDECTOMY  1998       Past Social Hx: Della Mata  reports that she quit smoking about 8 years ago. She has never used smokeless tobacco. She reports current alcohol use. She reports current drug use. Drug: Marijuana.     Past Family Hx:  Della Mata family history includes Hyperlipidemia in her mother; Hypertension in her mother; Lupus in her paternal grandmother.     Problem list, medications, and allergies reviewed by myself today in Epic.     Objective:     BP (!) 138/90 (BP Location: Right arm, Patient Position: Sitting, BP Cuff Size: Adult)   Pulse 88   Temp 35.9 °C (96.7 °F) (Temporal)   Resp 14   Ht 1.575 m (5' 2\")   Wt 68 kg (150 lb)   SpO2 98%   BMI " 27.44 kg/m²     Physical Exam  Vitals reviewed.   Constitutional:       General: She is not in acute distress.     Appearance: Normal appearance. She is not ill-appearing or toxic-appearing.   HENT:      Mouth/Throat:      Mouth: Mucous membranes are moist.      Pharynx: Oropharynx is clear. No oropharyngeal exudate or posterior oropharyngeal erythema.   Eyes:      Conjunctiva/sclera: Conjunctivae normal.      Pupils: Pupils are equal, round, and reactive to light.   Cardiovascular:      Rate and Rhythm: Normal rate and regular rhythm.      Heart sounds: Normal heart sounds.   Pulmonary:      Effort: Pulmonary effort is normal. No respiratory distress.      Breath sounds: Normal breath sounds. No wheezing, rhonchi or rales.   Musculoskeletal:      Cervical back: Neck supple.   Skin:     General: Skin is warm and dry.   Neurological:      General: No focal deficit present.      Mental Status: She is alert and oriented to person, place, and time.   Psychiatric:         Mood and Affect: Mood normal.         Behavior: Behavior normal.         RADIOLOGY RESULTS   DX-CHEST-2 VIEWS    Result Date: 7/6/2022 7/6/2022 5:16 PM HISTORY/REASON FOR EXAM:  Cough for 3 weeks TECHNIQUE/EXAM DESCRIPTION AND NUMBER OF VIEWS: Two views of the chest. COMPARISON:  12/13/2021. FINDINGS: LUNGS: Clear. No effusions. PNEUMOTHORAX: None. LINES AND TUBES: None. MEDIASTINUM: No cardiomegaly. MUSCULOSKELETAL STRUCTURES: No acute displaced fracture.     No acute cardiopulmonary abnormality.         Diagnosis and associated orders:     1. Bronchitis  - DX-CHEST-2 VIEWS; Future  - methylPREDNISolone (MEDROL DOSEPAK) 4 MG Tablet Therapy Pack; Follow schedule on package instructions.  Dispense: 21 Tablet; Refill: 0    2. Cough  - promethazine-dextromethorphan (PROMETHAZINE-DM) 6.25-15 MG/5ML syrup; Take 5 mL by mouth every four hours as needed for Cough.  Dispense: 120 mL; Refill: 0       Comments/MDM:     • X-ray results per radiologist  interpretation above. I personally reviewed images and radiologist report which showed no acute cardiopulmonary abnormality.   • Discussed treatment of medrol dose helen. OTC cough medicine such as Mucinex with expectorant, nasal saline washes, flonase nasal spray, allergy medications.        I personally reviewed prior external notes and test results pertinent to today's visit. Supportive care, natural history, differential diagnoses, and indications for immediate follow-up discussed. Patient expresses understanding and agrees to plan. Patient denies any other questions or concerns.     Follow-up with the primary care physician for recheck, reevaluation, and consideration of further management.    Please note that this dictation was created using voice recognition software. I have made a reasonable attempt to correct obvious errors, but I expect that there are errors of grammar and possibly content that I did not discover before finalizing the note.    This note was electronically signed by Ivan Lilly PA-C

## 2022-07-20 DIAGNOSIS — G35 MULTIPLE SCLEROSIS (HCC): ICD-10-CM

## 2022-07-24 RX ORDER — DEXTROAMPHETAMINE SACCHARATE, AMPHETAMINE ASPARTATE MONOHYDRATE, DEXTROAMPHETAMINE SULFATE AND AMPHETAMINE SULFATE 3.75; 3.75; 3.75; 3.75 MG/1; MG/1; MG/1; MG/1
15 CAPSULE, EXTENDED RELEASE ORAL EVERY MORNING
Qty: 30 CAPSULE | Refills: 0 | Status: SHIPPED | OUTPATIENT
Start: 2022-07-24 | End: 2022-08-29 | Stop reason: SDUPTHER

## 2022-08-08 ENCOUNTER — PATIENT MESSAGE (OUTPATIENT)
Dept: NEUROLOGY | Facility: MEDICAL CENTER | Age: 47
End: 2022-08-08
Payer: COMMERCIAL

## 2022-08-08 DIAGNOSIS — G35 MULTIPLE SCLEROSIS (HCC): ICD-10-CM

## 2022-08-08 RX ORDER — RENAGEL 800 MG/1
14 TABLET ORAL DAILY
Qty: 30 TABLET | Refills: 11 | Status: SHIPPED | OUTPATIENT
Start: 2022-08-08 | End: 2023-02-09 | Stop reason: SDUPTHER

## 2022-08-08 NOTE — PATIENT COMMUNICATION
Pt called to request a refill on Teriflunomide (AUBAGIO) 14 MG Tab to be sent ot the [facility]

## 2022-08-09 ENCOUNTER — TELEPHONE (OUTPATIENT)
Dept: NEUROLOGY | Facility: MEDICAL CENTER | Age: 47
End: 2022-08-09

## 2022-08-09 DIAGNOSIS — G35 MULTIPLE SCLEROSIS (HCC): ICD-10-CM

## 2022-08-09 RX ORDER — BACLOFEN 10 MG/1
10 TABLET ORAL 3 TIMES DAILY
Qty: 90 TABLET | Refills: 1 | Status: SHIPPED | OUTPATIENT
Start: 2022-08-09 | End: 2022-12-05 | Stop reason: SDUPTHER

## 2022-08-09 NOTE — TELEPHONE ENCOUNTER
Aubagio 14mg Tablet    Plan limitations exceeded. Specialty Rx: Member Call: [phone] The pharmacy is not in network [facility], will have liaison Madelaine blanca. - 08/09/2022 9:36am

## 2022-08-11 NOTE — PROGRESS NOTES
Called patient for follow up.  She just started the medrol helen.  Will call and set up an appointment for next Wednesday to be seen.

## 2022-08-12 ENCOUNTER — OFFICE VISIT (OUTPATIENT)
Dept: NEUROLOGY | Facility: MEDICAL CENTER | Age: 47
End: 2022-08-12
Attending: REGISTERED NURSE
Payer: COMMERCIAL

## 2022-08-12 VITALS
HEART RATE: 96 BPM | HEIGHT: 62 IN | RESPIRATION RATE: 14 BRPM | BODY MASS INDEX: 27.44 KG/M2 | OXYGEN SATURATION: 97 % | SYSTOLIC BLOOD PRESSURE: 122 MMHG | DIASTOLIC BLOOD PRESSURE: 80 MMHG | TEMPERATURE: 97.7 F

## 2022-08-12 DIAGNOSIS — M79.18 MYOFASCIAL PAIN SYNDROME: ICD-10-CM

## 2022-08-12 DIAGNOSIS — G35 MULTIPLE SCLEROSIS (HCC): ICD-10-CM

## 2022-08-12 DIAGNOSIS — J98.01 BRONCHOSPASM: ICD-10-CM

## 2022-08-12 PROCEDURE — 99212 OFFICE O/P EST SF 10 MIN: CPT | Performed by: REGISTERED NURSE

## 2022-08-12 PROCEDURE — 99215 OFFICE O/P EST HI 40 MIN: CPT | Performed by: REGISTERED NURSE

## 2022-08-12 RX ORDER — PREGABALIN 50 MG/1
50 CAPSULE ORAL 3 TIMES DAILY
Qty: 90 CAPSULE | Refills: 5 | Status: SHIPPED | OUTPATIENT
Start: 2022-08-12 | End: 2022-09-11
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
## 2022-08-18 PROBLEM — R23.1 LIVEDO RETICULARIS: Status: ACTIVE | Noted: 2022-08-18

## 2022-08-18 PROBLEM — L65.9 LOSS OF HAIR: Status: ACTIVE | Noted: 2022-08-18

## 2022-08-18 PROBLEM — I73.00 RAYNAUD'S PHENOMENON: Status: ACTIVE | Noted: 2022-08-18

## 2022-08-18 PROBLEM — R05.9 COUGH: Status: ACTIVE | Noted: 2022-08-18

## 2022-08-18 PROBLEM — D80.1 HYPOGAMMAGLOBULINEMIA (HCC): Status: ACTIVE | Noted: 2022-08-18

## 2022-08-18 PROBLEM — M32.9 SYSTEMIC LUPUS ERYTHEMATOSUS (HCC): Status: ACTIVE | Noted: 2022-08-18

## 2022-08-18 PROBLEM — G04.90 ENCEPHALITIS: Status: ACTIVE | Noted: 2022-08-18

## 2022-08-18 PROBLEM — R76.0 RAISED ANTIBODY TITER: Status: ACTIVE | Noted: 2022-08-18

## 2022-08-18 RX ORDER — CETIRIZINE HYDROCHLORIDE 10 MG/1
10 TABLET ORAL DAILY
Qty: 30 TABLET | Refills: 1 | Status: SHIPPED | OUTPATIENT
Start: 2022-08-18 | End: 2023-12-19

## 2022-08-18 RX ORDER — COVID-19 ANTIGEN TEST
KIT MISCELLANEOUS
COMMUNITY
Start: 2022-06-23 | End: 2023-12-19

## 2022-08-29 DIAGNOSIS — G35 MULTIPLE SCLEROSIS (HCC): ICD-10-CM

## 2022-08-30 RX ORDER — DEXTROAMPHETAMINE SACCHARATE, AMPHETAMINE ASPARTATE MONOHYDRATE, DEXTROAMPHETAMINE SULFATE AND AMPHETAMINE SULFATE 3.75; 3.75; 3.75; 3.75 MG/1; MG/1; MG/1; MG/1
15 CAPSULE, EXTENDED RELEASE ORAL EVERY MORNING
Qty: 30 CAPSULE | Refills: 0 | Status: SHIPPED | OUTPATIENT
Start: 2022-08-30 | End: 2022-09-08 | Stop reason: SDUPTHER

## 2022-09-08 ENCOUNTER — PATIENT MESSAGE (OUTPATIENT)
Dept: NEUROLOGY | Facility: MEDICAL CENTER | Age: 47
End: 2022-09-08
Payer: COMMERCIAL

## 2022-09-08 DIAGNOSIS — G35 MULTIPLE SCLEROSIS (HCC): ICD-10-CM

## 2022-09-08 RX ORDER — DEXTROAMPHETAMINE SACCHARATE, AMPHETAMINE ASPARTATE MONOHYDRATE, DEXTROAMPHETAMINE SULFATE AND AMPHETAMINE SULFATE 3.75; 3.75; 3.75; 3.75 MG/1; MG/1; MG/1; MG/1
15 CAPSULE, EXTENDED RELEASE ORAL EVERY MORNING
Qty: 30 CAPSULE | Refills: 0 | Status: SHIPPED | OUTPATIENT
Start: 2022-09-08 | End: 2022-10-05 | Stop reason: SDUPTHER

## 2022-09-08 RX ORDER — DEXTROAMPHETAMINE SACCHARATE, AMPHETAMINE ASPARTATE MONOHYDRATE, DEXTROAMPHETAMINE SULFATE AND AMPHETAMINE SULFATE 3.75; 3.75; 3.75; 3.75 MG/1; MG/1; MG/1; MG/1
15 CAPSULE, EXTENDED RELEASE ORAL EVERY MORNING
Qty: 30 CAPSULE | Refills: 0 | Status: SHIPPED | OUTPATIENT
Start: 2022-09-08 | End: 2022-09-08 | Stop reason: SDUPTHER

## 2022-09-30 DIAGNOSIS — G43.111 INTRACTABLE MIGRAINE WITH AURA WITH STATUS MIGRAINOSUS: ICD-10-CM

## 2022-09-30 DIAGNOSIS — G35 MULTIPLE SCLEROSIS (HCC): ICD-10-CM

## 2022-09-30 RX ORDER — FLUOXETINE HYDROCHLORIDE 40 MG/1
CAPSULE ORAL
Qty: 90 CAPSULE | Refills: 3 | Status: SHIPPED | OUTPATIENT
Start: 2022-09-30 | End: 2022-12-05 | Stop reason: SDUPTHER

## 2022-09-30 RX ORDER — SUMATRIPTAN 100 MG/1
100 TABLET, FILM COATED ORAL
Qty: 10 TABLET | Refills: 5 | Status: SHIPPED | OUTPATIENT
Start: 2022-09-30 | End: 2022-12-05 | Stop reason: SDUPTHER

## 2022-10-05 DIAGNOSIS — G43.111 INTRACTABLE MIGRAINE WITH AURA WITH STATUS MIGRAINOSUS: ICD-10-CM

## 2022-10-05 DIAGNOSIS — G35 MULTIPLE SCLEROSIS (HCC): ICD-10-CM

## 2022-10-05 DIAGNOSIS — R11.0 NAUSEA: ICD-10-CM

## 2022-10-06 RX ORDER — HYDROXYZINE PAMOATE 25 MG/1
25 CAPSULE ORAL 3 TIMES DAILY PRN
Qty: 90 CAPSULE | Refills: 0 | Status: SHIPPED | OUTPATIENT
Start: 2022-10-06 | End: 2024-01-16 | Stop reason: SDUPTHER

## 2022-10-06 RX ORDER — TOPIRAMATE 50 MG/1
50 TABLET, FILM COATED ORAL 2 TIMES DAILY
Qty: 60 TABLET | Refills: 0 | Status: SHIPPED | OUTPATIENT
Start: 2022-10-06 | End: 2022-12-05 | Stop reason: SDUPTHER

## 2022-10-06 RX ORDER — ONDANSETRON 4 MG/1
8 TABLET, FILM COATED ORAL EVERY 4 HOURS PRN
Qty: 20 TABLET | Refills: 0 | Status: SHIPPED | OUTPATIENT
Start: 2022-10-06 | End: 2023-01-06 | Stop reason: SDUPTHER

## 2022-10-06 RX ORDER — DEXTROAMPHETAMINE SACCHARATE, AMPHETAMINE ASPARTATE MONOHYDRATE, DEXTROAMPHETAMINE SULFATE AND AMPHETAMINE SULFATE 3.75; 3.75; 3.75; 3.75 MG/1; MG/1; MG/1; MG/1
15 CAPSULE, EXTENDED RELEASE ORAL EVERY MORNING
Qty: 30 CAPSULE | Refills: 0 | Status: SHIPPED | OUTPATIENT
Start: 2022-10-06 | End: 2022-11-01 | Stop reason: SDUPTHER

## 2022-10-06 RX ORDER — TIZANIDINE 4 MG/1
4 TABLET ORAL EVERY 6 HOURS PRN
Qty: 60 TABLET | Refills: 0 | Status: SHIPPED | OUTPATIENT
Start: 2022-10-06 | End: 2023-06-15 | Stop reason: SDUPTHER

## 2022-11-01 DIAGNOSIS — G35 MULTIPLE SCLEROSIS (HCC): ICD-10-CM

## 2022-11-01 RX ORDER — DEXTROAMPHETAMINE SACCHARATE, AMPHETAMINE ASPARTATE MONOHYDRATE, DEXTROAMPHETAMINE SULFATE AND AMPHETAMINE SULFATE 3.75; 3.75; 3.75; 3.75 MG/1; MG/1; MG/1; MG/1
15 CAPSULE, EXTENDED RELEASE ORAL EVERY MORNING
Qty: 30 CAPSULE | Refills: 0 | Status: SHIPPED | OUTPATIENT
Start: 2022-11-01 | End: 2022-12-05

## 2022-11-28 ENCOUNTER — TELEPHONE (OUTPATIENT)
Dept: NEUROLOGY | Facility: MEDICAL CENTER | Age: 47
End: 2022-11-28
Payer: COMMERCIAL

## 2022-11-28 NOTE — TELEPHONE ENCOUNTER
Established Patient     [facility] Patient is checked in Patient Demographics? Yes    Is visit type and length correct?  Yes    Is referral attached to visit? Yes    Were records received from referring provider? Yes    Patient was not contacted to have someone accompany them to visit?    Is this appointment scheduled as a Hospital Follow-Up?  No    Does the patient require any pre procedure or post procedure follow up? No    If any orders were placed at last visit or intended to be done for this visit do we have Results/Consult Notes? Yes  Labs - Labs were not ordered at last office visit.  Imaging - Imaging was ordered but NOT completed.  Referrals - No referrals were ordered at last office visit.        10.  If patient appointment is for Botox - is order pended for provider? No

## 2022-12-05 ENCOUNTER — OFFICE VISIT (OUTPATIENT)
Dept: NEUROLOGY | Facility: MEDICAL CENTER | Age: 47
End: 2022-12-05
Attending: PSYCHIATRY & NEUROLOGY
Payer: COMMERCIAL

## 2022-12-05 VITALS
TEMPERATURE: 97.8 F | HEIGHT: 62 IN | WEIGHT: 145.5 LBS | OXYGEN SATURATION: 98 % | SYSTOLIC BLOOD PRESSURE: 122 MMHG | BODY MASS INDEX: 26.78 KG/M2 | HEART RATE: 86 BPM | RESPIRATION RATE: 18 BRPM | DIASTOLIC BLOOD PRESSURE: 86 MMHG

## 2022-12-05 DIAGNOSIS — F32.9 REACTIVE DEPRESSION: ICD-10-CM

## 2022-12-05 DIAGNOSIS — G35 MULTIPLE SCLEROSIS (HCC): ICD-10-CM

## 2022-12-05 DIAGNOSIS — G43.111 INTRACTABLE MIGRAINE WITH AURA WITH STATUS MIGRAINOSUS: ICD-10-CM

## 2022-12-05 DIAGNOSIS — M32.9 SYSTEMIC LUPUS ERYTHEMATOSUS, UNSPECIFIED SLE TYPE, UNSPECIFIED ORGAN INVOLVEMENT STATUS (HCC): ICD-10-CM

## 2022-12-05 PROCEDURE — 99212 OFFICE O/P EST SF 10 MIN: CPT | Performed by: PSYCHIATRY & NEUROLOGY

## 2022-12-05 PROCEDURE — 99214 OFFICE O/P EST MOD 30 MIN: CPT | Performed by: PSYCHIATRY & NEUROLOGY

## 2022-12-05 RX ORDER — DEXTROAMPHETAMINE SACCHARATE, AMPHETAMINE ASPARTATE MONOHYDRATE, DEXTROAMPHETAMINE SULFATE AND AMPHETAMINE SULFATE 5; 5; 5; 5 MG/1; MG/1; MG/1; MG/1
20 CAPSULE, EXTENDED RELEASE ORAL EVERY MORNING
Qty: 30 CAPSULE | Refills: 0 | Status: SHIPPED | OUTPATIENT
Start: 2022-12-05 | End: 2023-01-06 | Stop reason: SDUPTHER

## 2022-12-05 RX ORDER — TOPIRAMATE 50 MG/1
50 TABLET, FILM COATED ORAL 2 TIMES DAILY
Qty: 180 TABLET | Refills: 3 | Status: SHIPPED | OUTPATIENT
Start: 2022-12-05 | End: 2023-03-05

## 2022-12-05 RX ORDER — FLUOXETINE HYDROCHLORIDE 40 MG/1
CAPSULE ORAL
Qty: 90 CAPSULE | Refills: 3 | Status: SHIPPED | OUTPATIENT
Start: 2022-12-05 | End: 2024-01-16 | Stop reason: SDUPTHER

## 2022-12-05 RX ORDER — SUMATRIPTAN 100 MG/1
100 TABLET, FILM COATED ORAL
Qty: 30 TABLET | Refills: 3 | Status: SHIPPED | OUTPATIENT
Start: 2022-12-05 | End: 2023-06-15 | Stop reason: SDUPTHER

## 2022-12-05 RX ORDER — BACLOFEN 10 MG/1
10 TABLET ORAL 3 TIMES DAILY
Qty: 90 TABLET | Refills: 1 | Status: SHIPPED | OUTPATIENT
Start: 2022-12-05 | End: 2023-02-09 | Stop reason: SDUPTHER

## 2022-12-06 NOTE — PROGRESS NOTES
Chief Complaint   Patient presents with    Follow-Up     MRI results.       Problem List Items Addressed This Visit       Multiple sclerosis (HCC)    Relevant Medications    amphetamine-dextroamphetamine (ADDERALL XR, 20MG,) 20 MG per XR capsule    fluoxetine (PROZAC) 40 MG capsule    baclofen (LIORESAL) 10 MG Tab    Intractable migraine with aura with status migrainosus    Relevant Medications    topiramate (TOPAMAX) 50 MG tablet    fluoxetine (PROZAC) 40 MG capsule    baclofen (LIORESAL) 10 MG Tab    sumatriptan (IMITREX) 100 MG tablet    Reactive depression    Relevant Medications    fluoxetine (PROZAC) 40 MG capsule    Systemic lupus erythematosus (HCC)     Patient feels more joint pain.         Relevant Orders    Referral to Rheumatology       History of present illness:  Della Mata 47 y.o. female presents today for multiple sclerosis and migraine with new symptoms in her right arm and increasing joint pain.  Patient has cervical degenerative disc disease which is making her migraine headaches worse.    Past medical history:   Past Medical History:   Diagnosis Date    Head ache     migraines    Lupus (HCC)     Multiple sclerosis (HCC)        Past surgical history:   Past Surgical History:   Procedure Laterality Date    ABDOMINAL HYSTERECTOMY TOTAL  2013    APPENDECTOMY  1998       Family history:   Family History   Problem Relation Age of Onset    Hypertension Mother     Hyperlipidemia Mother     Lupus Paternal Grandmother        Social history:   Social History     Socioeconomic History    Marital status: Single     Spouse name: Not on file    Number of children: Not on file    Years of education: Not on file    Highest education level: Not on file   Occupational History    Not on file   Tobacco Use    Smoking status: Former     Types: Cigarettes     Quit date:      Years since quittin.9    Smokeless tobacco: Never   Vaping Use    Vaping Use: Every day    Substances: THC   Substance and Sexual  Activity    Alcohol use: Yes     Comment: 4-5 per week    Drug use: Yes     Types: Marijuana    Sexual activity: Not on file   Other Topics Concern    Not on file   Social History Narrative    Not on file     Social Determinants of Health     Financial Resource Strain: Not on file   Food Insecurity: Not on file   Transportation Needs: Not on file   Physical Activity: Not on file   Stress: Not on file   Social Connections: Not on file   Intimate Partner Violence: Not on file   Housing Stability: Not on file       Current medications:   Current Outpatient Medications   Medication    amphetamine-dextroamphetamine (ADDERALL XR, 20MG,) 20 MG per XR capsule    topiramate (TOPAMAX) 50 MG tablet    fluoxetine (PROZAC) 40 MG capsule    baclofen (LIORESAL) 10 MG Tab    sumatriptan (IMITREX) 100 MG tablet    ondansetron (ZOFRAN) 4 MG Tab tablet    tizanidine (ZANAFLEX) 4 MG Tab    hydrOXYzine pamoate (VISTARIL) 25 MG Cap    cetirizine (ZYRTEC) 10 MG Tab    FLOWFLEX COVID-19 AG HOME TEST Kit    Teriflunomide (AUBAGIO) 14 MG Tab    methylPREDNISolone (MEDROL DOSEPAK) 4 MG Tablet Therapy Pack    Cholecalciferol (VITAMIN D3) 1.25 MG (80345 UT) Cap    estradiol (ESTRACE) 0.1 MG/GM vaginal cream    PREVIDENT 1.1 % Gel     Current Facility-Administered Medications   Medication Dose    ondansetron (ZOFRAN ODT) dispertab 8 mg  8 mg       Medication Allergy:  Allergies   Allergen Reactions    Tramadol Rash    Ampicillin     Erythromycin     Hydrocodone-Acetaminophen     Tape        Review of systems:   Constitutional: denies fever, night sweats, weight loss.   Eyes: denies acute vision change, eye pain or secretion.   Ears, Nose, Mouth, Throat: denies nasal secretion, nasal bleeding, difficulty swallowing, hearing loss, tinnitus, vertigo, ear pain, acute dental problems, oral ulcers or lesions.   Endocrine: denies recent weight changes, heat or cold intolerance, polyuria, polydypsia, polyphagia,abnormal hair growth.  Cardiovascular:  "denies new onset of chest pain, palpitations, syncope, or dyspnea of exertion.  Pulmonary: denies shortness of breath, new onset of cough, hemoptysis, wheezing, chest pain or flu-like symptoms.   GI: denies nausea, vomiting, diarrhea, GI bleeding, change in appetite, abdominal pain, and change in bowel habits.  : denies dysuria, urinary incontinence, hematuria.  Heme/oncology: denies history of easy bruising or bleeding. No history of cancer, DVTor PE.  Allergy/immunology: denies hives/urticaria, or itching.   Dermatologic: denies new rash, or new skin lesions.  Musculoskeletal:denies joint swelling or pain, muscle pain, neck and back pain. Neurologic: denies headaches, acute visual changes, facial droopiness, muscle weakness (focal or generalized), paresthesias, anesthesia, ataxia, change in speech or language, memory loss, abnormal movements, seizures, loss of consciousness, or episodes of confusion.   Psychiatric: denies symptoms of depression, anxiety, hallucinations, mood swings or changes, suicidal or homicidal thoughts.     Physical examination:   Vitals:    12/05/22 1531   BP: 122/86   BP Location: Right arm   Patient Position: Sitting   BP Cuff Size: Adult   Pulse: 86   Resp: 18   Temp: 36.6 °C (97.8 °F)   TempSrc: Temporal   SpO2: 98%   Weight: 66 kg (145 lb 8.1 oz)   Height: 1.575 m (5' 2\")     General: Patient in no acute distress, pleasant and cooperative.  HEENT: Normocephalic, no signs of acute trauma.   Neck: supple, no meningeal signs or carotid bruits. There is normal range of motion. No tenderness on exam.   Chest: clear to auscultation. No cough.   CV: RRR, no murmurs.   Skin: no signs of acute rashes or trauma.   Musculoskeletal: joints exhibit full range of motion, without any pain to palpation. There are no signs of joint or muscle swelling. There is no tenderness to deep palpation of muscles.   Psychiatric: No hallucinatory behavior. Denies symptoms of depression or suicidal ideation. Mood " and affect appear normal on exam.     NEUROLOGICAL EXAM:  Mental status, orientation: Awake, alert and fully oriented.   Speech and language: speech is clear and fluent. The patient is able to name, repeat and comprehend.   Memory: There is intact recollection of recent and remote events.   Cranial nerve exam: Pupils are 3-4 mm bilaterally and equally reactive to light and accommodation. Visual fields are intact by confrontation. Fundoscopic exam was unremarkable. There is no nystagmus on primary or secondary gaze. Intact full EOM in all directions of gaze. Face appears symmetric. Sensation in the face is intact to light touch. Uvula is midline. Palate elevates symmetrically. Tongue is midline and without any signs of tongue biting or fasciculations. Sternocleidomastoid muscles exhibit is normal strength bilaterally. Shoulder shrug is intact bilaterally.   Motor exam: Strength is 5/5 in all extremities. Tone is normal. No abnormal movements were seen on exam.   Sensory exam reveals normal sense of light touch, proprioception, vibration and pinprick in all extremities.   Deep tendon reflexes:  2+ throughout. Plantar responses are flexor. There is no clonus.   Coordination: shows a normal finger-nose-finger. Normal rapidly alternating movements.   Gait: The patient was able to get up from seated position on first attempt without requiring assistance. Found to be steady when walking. Movements were fluid with normal arm swing. The patient was able to turn without difficulties or tendency to fall. Romberg examination +      ANCILLARY DATA REVIEWED:     Lab Data Review:  No results found for this or any previous visit (from the past 24 hour(s)).    Records reviewed: I reviewed last laboratory testing from [facility].  Patient has a stable low white count.      Imaging: I reviewed last neuroimaging with the patient which was done last week at [facility] which shows stable multiple sclerosis but worsening of cervical  degenerative disc disease causing foraminal stenosis with osteophytes worse on her right side.          ASSESSMENT AND PLAN:    1. Multiple sclerosis (HCC)  Plan to continue with Aubagio.  I reviewed the recent MRI of the cervical spine.  Discussed physical therapy options for her neck myelopathy in addition to cervical degenerative disc disease.  She will discuss options with Rhonda Mckeon and continue with traction management.  - MR-CERVICAL SPINE-WITH & W/O  - amphetamine-dextroamphetamine (ADDERALL XR, 20MG,) 20 MG per XR capsule; Take 1 Capsule by mouth every morning for 30 days.  Dispense: 30 Capsule; Refill: 0  - fluoxetine (PROZAC) 40 MG capsule; fluoxetine 40 mg capsule daily  Dispense: 90 Capsule; Refill: 3  - baclofen (LIORESAL) 10 MG Tab; Take 1 Tablet by mouth 3 times a day.  Dispense: 90 Tablet; Refill: 1    2. Systemic lupus erythematosus, unspecified SLE type, unspecified organ involvement status ([facility])  Referred her to rheumatology for treatment of SLE.  - Referral to Rheumatology    3. Intractable migraine with aura with status migrainosus  I refilled the following medications for her migraine headache.  - topiramate (TOPAMAX) 50 MG tablet; Take 1 Tablet by mouth 2 times a day for 90 days.  Dispense: 180 Tablet; Refill: 3  - sumatriptan (IMITREX) 100 MG tablet; Take 1 Tablet by mouth one time as needed for Migraine for up to 1 dose.  Dispense: 30 Tablet; Refill: 3    4. Reactive depression  I have refilled her Prozac.  - fluoxetine (PROZAC) 40 MG capsule; fluoxetine 40 mg capsule daily  Dispense: 90 Capsule; Refill: 3        FOLLOW-UP:   Return in about 4 months (around 4/5/2023).      My total time spent caring for the patient on the day of the encounter was 33 minutes.   This does not include time spent on separately billable procedures/tests.     EDUCATION AND COUNSELING:  -Discussed regular exercise program and prevention of cardiovascular disease, including stroke.   -Discussed healthy  lifestyle, including: healthy diet (rich in fruits, vegetables, nuts and healthy oils); proper hydration, and adequate sleep hygiene (allowing 7-8 hrs of overnight sleep).        Melissa Bloch, MD  Clinical  of Neurology [facility].   Diplomate in Neurology.   Office: [phone]  Fax: [phone]

## 2023-01-06 DIAGNOSIS — G35 MULTIPLE SCLEROSIS (HCC): ICD-10-CM

## 2023-01-06 RX ORDER — DEXTROAMPHETAMINE SACCHARATE, AMPHETAMINE ASPARTATE MONOHYDRATE, DEXTROAMPHETAMINE SULFATE AND AMPHETAMINE SULFATE 5; 5; 5; 5 MG/1; MG/1; MG/1; MG/1
20 CAPSULE, EXTENDED RELEASE ORAL EVERY MORNING
Qty: 30 CAPSULE | Refills: 0 | Status: SHIPPED | OUTPATIENT
Start: 2023-01-06 | End: 2023-02-09 | Stop reason: SDUPTHER

## 2023-02-09 DIAGNOSIS — G35 MULTIPLE SCLEROSIS (HCC): ICD-10-CM

## 2023-02-09 NOTE — TELEPHONE ENCOUNTER
Received request via: Patient    Was the patient seen in the last year in this department? Yes    Does the patient have an active prescription (recently filled or refills available) for medication(s) requested? No    Does the patient have longterm Plus and need 100 day supply (blood pressure, diabetes and cholesterol meds only)? Medication is not for cholesterol, blood pressure or diabetes

## 2023-02-10 RX ORDER — BACLOFEN 10 MG/1
10 TABLET ORAL 3 TIMES DAILY
Qty: 90 TABLET | Refills: 1 | Status: SHIPPED | OUTPATIENT
Start: 2023-02-10 | End: 2023-05-12 | Stop reason: SDUPTHER

## 2023-02-10 RX ORDER — DEXTROAMPHETAMINE SACCHARATE, AMPHETAMINE ASPARTATE MONOHYDRATE, DEXTROAMPHETAMINE SULFATE AND AMPHETAMINE SULFATE 5; 5; 5; 5 MG/1; MG/1; MG/1; MG/1
20 CAPSULE, EXTENDED RELEASE ORAL EVERY MORNING
Qty: 30 CAPSULE | Refills: 0 | Status: SHIPPED | OUTPATIENT
Start: 2023-02-10 | End: 2023-03-09 | Stop reason: SDUPTHER

## 2023-02-10 RX ORDER — RENAGEL 800 MG/1
14 TABLET ORAL DAILY
Qty: 30 TABLET | Refills: 11 | Status: SHIPPED | OUTPATIENT
Start: 2023-02-10 | End: 2023-09-25 | Stop reason: SDUPTHER

## 2023-02-13 ENCOUNTER — TELEPHONE (OUTPATIENT)
Dept: NEUROLOGY | Facility: MEDICAL CENTER | Age: 48
End: 2023-02-13

## 2023-02-13 NOTE — TELEPHONE ENCOUNTER
Aubagio 14mg Tablet    PA approved request Reference Number: [id number]. AUBAGIO TAB 14MG is approved through 02/10/2024 #30/30 DS, however we CANNOT fill as NOT contracted, will have liaison Madelaine Theodore release. - 02/13/2023 9:21am

## 2023-03-09 DIAGNOSIS — G35 MULTIPLE SCLEROSIS (HCC): ICD-10-CM

## 2023-03-10 RX ORDER — DEXTROAMPHETAMINE SACCHARATE, AMPHETAMINE ASPARTATE MONOHYDRATE, DEXTROAMPHETAMINE SULFATE AND AMPHETAMINE SULFATE 5; 5; 5; 5 MG/1; MG/1; MG/1; MG/1
20 CAPSULE, EXTENDED RELEASE ORAL EVERY MORNING
Qty: 30 CAPSULE | Refills: 0 | Status: SHIPPED | OUTPATIENT
Start: 2023-03-10 | End: 2023-04-11 | Stop reason: SDUPTHER

## 2023-03-10 NOTE — TELEPHONE ENCOUNTER
Received request via: Patient    Was the patient seen in the last year in this department? Yes    Does the patient have an active prescription (recently filled or refills available) for medication(s) requested? No    Does the patient have intermediate Plus and need 100 day supply (blood pressure, diabetes and cholesterol meds only)? Medication is not for cholesterol, blood pressure or diabetes.     Requesting refill for amphetamine-dextroamphetamine (ADDERALL XR, 20MG,) 20 MG per XR capsule. To be filled at [facility] ([facility] ) - [place], [place] - [address]

## 2023-04-11 DIAGNOSIS — G35 MULTIPLE SCLEROSIS (HCC): ICD-10-CM

## 2023-04-12 RX ORDER — DEXTROAMPHETAMINE SACCHARATE, AMPHETAMINE ASPARTATE MONOHYDRATE, DEXTROAMPHETAMINE SULFATE AND AMPHETAMINE SULFATE 5; 5; 5; 5 MG/1; MG/1; MG/1; MG/1
20 CAPSULE, EXTENDED RELEASE ORAL EVERY MORNING
Qty: 30 CAPSULE | Refills: 0 | Status: SHIPPED | OUTPATIENT
Start: 2023-04-12 | End: 2023-05-12 | Stop reason: SDUPTHER

## 2023-04-12 NOTE — TELEPHONE ENCOUNTER
Received request via: Pharmacy    Was the patient seen in the last year in this department? Yes    Does the patient have an active prescription (recently filled or refills available) for medication(s) requested? Yes.   Does the patient have [facility] Plus and need 100 day supply (blood pressure, diabetes and cholesterol meds only)? Patient does not have SCP

## 2023-05-12 DIAGNOSIS — G35 MULTIPLE SCLEROSIS (HCC): ICD-10-CM

## 2023-05-12 RX ORDER — DEXTROAMPHETAMINE SACCHARATE, AMPHETAMINE ASPARTATE MONOHYDRATE, DEXTROAMPHETAMINE SULFATE AND AMPHETAMINE SULFATE 5; 5; 5; 5 MG/1; MG/1; MG/1; MG/1
20 CAPSULE, EXTENDED RELEASE ORAL EVERY MORNING
Qty: 30 CAPSULE | Refills: 0 | Status: SHIPPED | OUTPATIENT
Start: 2023-05-12 | End: 2023-06-15 | Stop reason: SDUPTHER

## 2023-05-12 RX ORDER — BACLOFEN 10 MG/1
10 TABLET ORAL 3 TIMES DAILY
Qty: 90 TABLET | Refills: 1 | Status: SHIPPED | OUTPATIENT
Start: 2023-05-12 | End: 2024-01-16 | Stop reason: SDUPTHER

## 2023-05-12 NOTE — TELEPHONE ENCOUNTER
Received request via: Patient    Was the patient seen in the last year in this department? Yes    Does the patient have an active prescription (recently filled or refills available) for medication(s) requested? Yes.     Does the patient have FDC Plus and need 100 day supply (blood pressure, diabetes and cholesterol meds only)? Medication is not for cholesterol, blood pressure or diabetes

## 2023-05-12 NOTE — TELEPHONE ENCOUNTER
Received request via: Patient    Was the patient seen in the last year in this department? Yes    Does the patient have an active prescription (recently filled or refills available) for medication(s) requested? Yes.    Does the patient have longterm Plus and need 100 day supply (blood pressure, diabetes and cholesterol meds only)? Medication is not for cholesterol, blood pressure or diabetes

## 2023-06-15 ENCOUNTER — PATIENT MESSAGE (OUTPATIENT)
Dept: NEUROLOGY | Facility: MEDICAL CENTER | Age: 48
End: 2023-06-15
Payer: COMMERCIAL

## 2023-06-15 DIAGNOSIS — G35 MULTIPLE SCLEROSIS (HCC): ICD-10-CM

## 2023-06-15 DIAGNOSIS — G43.111 INTRACTABLE MIGRAINE WITH AURA WITH STATUS MIGRAINOSUS: ICD-10-CM

## 2023-06-15 RX ORDER — SUMATRIPTAN 100 MG/1
100 TABLET, FILM COATED ORAL
Qty: 30 TABLET | Refills: 3 | Status: SHIPPED | OUTPATIENT
Start: 2023-06-15 | End: 2024-01-16 | Stop reason: SDUPTHER

## 2023-06-15 RX ORDER — TIZANIDINE 4 MG/1
4 TABLET ORAL EVERY 6 HOURS PRN
Qty: 60 TABLET | Refills: 3 | Status: SHIPPED | OUTPATIENT
Start: 2023-06-15 | End: 2024-01-16 | Stop reason: SDUPTHER

## 2023-06-15 RX ORDER — DEXTROAMPHETAMINE SACCHARATE, AMPHETAMINE ASPARTATE MONOHYDRATE, DEXTROAMPHETAMINE SULFATE AND AMPHETAMINE SULFATE 5; 5; 5; 5 MG/1; MG/1; MG/1; MG/1
20 CAPSULE, EXTENDED RELEASE ORAL EVERY MORNING
Qty: 30 CAPSULE | Refills: 0 | Status: SHIPPED | OUTPATIENT
Start: 2023-06-15 | End: 2023-07-13 | Stop reason: SDUPTHER

## 2023-06-15 NOTE — PATIENT COMMUNICATION
Sumatriptan (Imitrex) 100 MG Tabs - Request rec'd via AL, RTVAISHNAVI Randolph paid copay $3.94 #9/30 DS notifying liaison Madelaine Theodore or Outreach. - 06/15/2023 1:49am

## 2023-06-15 NOTE — TELEPHONE ENCOUNTER
Della Mata [facility]  Phone Number: [phone]     Good Morning,     I am trying to refill my Tizanidine and it will not allow me to.  Can you please send a refill to [facility] for me?   Thank you,     Mauro

## 2023-06-15 NOTE — TELEPHONE ENCOUNTER
Received request via: Pharmacy    Was the patient seen in the last year in this department? Yes    Does the patient have an active prescription (recently filled or refills available) for medication(s) requested? Yes.     Does the patient have [facility] Plus and need 100 day supply (blood pressure, diabetes and cholesterol meds only)? Patient does not have SCP

## 2023-07-13 DIAGNOSIS — G35 MULTIPLE SCLEROSIS (HCC): ICD-10-CM

## 2023-07-13 RX ORDER — DEXTROAMPHETAMINE SACCHARATE, AMPHETAMINE ASPARTATE MONOHYDRATE, DEXTROAMPHETAMINE SULFATE AND AMPHETAMINE SULFATE 5; 5; 5; 5 MG/1; MG/1; MG/1; MG/1
20 CAPSULE, EXTENDED RELEASE ORAL EVERY MORNING
Qty: 30 CAPSULE | Refills: 0 | Status: SHIPPED | OUTPATIENT
Start: 2023-07-13 | End: 2023-08-15 | Stop reason: SDUPTHER

## 2023-08-15 DIAGNOSIS — G35 MULTIPLE SCLEROSIS (HCC): ICD-10-CM

## 2023-08-15 NOTE — TELEPHONE ENCOUNTER
Received request via: Pharmacy    Was the patient seen in the last year in this department? Yes    Does the patient have an active prescription (recently filled or refills available) for medication(s) requested? Yes.     Does the patient have care home Plus and need 100 day supply (blood pressure, diabetes and cholesterol meds only)? Patient does not have SCP    Dr Bloch pt, please refill.

## 2023-08-16 RX ORDER — DEXTROAMPHETAMINE SACCHARATE, AMPHETAMINE ASPARTATE MONOHYDRATE, DEXTROAMPHETAMINE SULFATE AND AMPHETAMINE SULFATE 5; 5; 5; 5 MG/1; MG/1; MG/1; MG/1
20 CAPSULE, EXTENDED RELEASE ORAL EVERY MORNING
Qty: 30 CAPSULE | Refills: 0 | Status: SHIPPED | OUTPATIENT
Start: 2023-08-16 | End: 2023-09-11 | Stop reason: SDUPTHER

## 2023-09-11 DIAGNOSIS — G35 MULTIPLE SCLEROSIS (HCC): ICD-10-CM

## 2023-09-12 RX ORDER — DEXTROAMPHETAMINE SACCHARATE, AMPHETAMINE ASPARTATE MONOHYDRATE, DEXTROAMPHETAMINE SULFATE AND AMPHETAMINE SULFATE 5; 5; 5; 5 MG/1; MG/1; MG/1; MG/1
20 CAPSULE, EXTENDED RELEASE ORAL EVERY MORNING
Qty: 30 CAPSULE | Refills: 0 | Status: SHIPPED | OUTPATIENT
Start: 2023-09-12 | End: 2023-10-11 | Stop reason: SDUPTHER

## 2023-09-25 DIAGNOSIS — G35 MULTIPLE SCLEROSIS (HCC): ICD-10-CM

## 2023-09-25 RX ORDER — TERIFLUNOMIDE 14 MG/1
14 TABLET, FILM COATED ORAL DAILY
Qty: 30 TABLET | Refills: 11 | Status: SHIPPED | OUTPATIENT
Start: 2023-09-25 | End: 2024-01-16 | Stop reason: SDUPTHER

## 2023-09-25 RX ORDER — TERIFLUNOMIDE 14 MG/1
14 TABLET, FILM COATED ORAL DAILY
Qty: 30 TABLET | Refills: 11 | Status: SHIPPED | OUTPATIENT
Start: 2023-09-25 | End: 2024-01-04 | Stop reason: SDUPTHER

## 2023-09-25 NOTE — TELEPHONE ENCOUNTER
Received request via: Pharmacy    Was the patient seen in the last year in this department? Yes    Does the patient have an active prescription (recently filled or refills available) for medication(s) requested? Yes.     Does the patient have [facility] Plus and need 100 day supply (blood pressure, diabetes and cholesterol meds only)? No

## 2023-10-11 DIAGNOSIS — G35 MULTIPLE SCLEROSIS (HCC): ICD-10-CM

## 2023-10-19 RX ORDER — DEXTROAMPHETAMINE SACCHARATE, AMPHETAMINE ASPARTATE MONOHYDRATE, DEXTROAMPHETAMINE SULFATE AND AMPHETAMINE SULFATE 5; 5; 5; 5 MG/1; MG/1; MG/1; MG/1
20 CAPSULE, EXTENDED RELEASE ORAL EVERY MORNING
Qty: 30 CAPSULE | Refills: 0 | Status: SHIPPED | OUTPATIENT
Start: 2023-10-19 | End: 2023-11-14 | Stop reason: SDUPTHER

## 2023-11-14 DIAGNOSIS — G35 MULTIPLE SCLEROSIS (HCC): ICD-10-CM

## 2023-11-14 NOTE — TELEPHONE ENCOUNTER
Received request via: Patient    Was the patient seen in the last year in this department? No   Date of last office visit 12/05/22     Per last Neurology Office Visit, when was the date of next follow up visit set for?                            Date of office visit follow up request 4 months      Does the patient have an upcoming appointment? No   If yes, when?     Does the patient have an active prescription (recently filled or refills available) for medication(s) requested? No    Does the patient have alf Plus and need 100 day supply (blood pressure, diabetes and cholesterol meds only)? Patient does not have SCP

## 2023-11-15 RX ORDER — DEXTROAMPHETAMINE SACCHARATE, AMPHETAMINE ASPARTATE MONOHYDRATE, DEXTROAMPHETAMINE SULFATE AND AMPHETAMINE SULFATE 5; 5; 5; 5 MG/1; MG/1; MG/1; MG/1
20 CAPSULE, EXTENDED RELEASE ORAL EVERY MORNING
Qty: 30 CAPSULE | Refills: 0 | Status: SHIPPED | OUTPATIENT
Start: 2023-11-15 | End: 2023-12-14 | Stop reason: SDUPTHER

## 2023-12-14 DIAGNOSIS — G35 MULTIPLE SCLEROSIS (HCC): ICD-10-CM

## 2023-12-15 RX ORDER — DEXTROAMPHETAMINE SACCHARATE, AMPHETAMINE ASPARTATE MONOHYDRATE, DEXTROAMPHETAMINE SULFATE AND AMPHETAMINE SULFATE 5; 5; 5; 5 MG/1; MG/1; MG/1; MG/1
20 CAPSULE, EXTENDED RELEASE ORAL EVERY MORNING
Qty: 30 CAPSULE | Refills: 0 | Status: SHIPPED | OUTPATIENT
Start: 2023-12-15 | End: 2024-01-08 | Stop reason: SDUPTHER

## 2023-12-15 NOTE — TELEPHONE ENCOUNTER
Received request via: Patient    Was the patient seen in the last year in this department? No   Date of last office visit 12/05/2022     Per last Neurology Office Visit, when was the date of next follow up visit set for?                            Date of office visit follow up request 4 months      Does the patient have an upcoming appointment? No   If yes, when?     Does the patient have an active prescription (recently filled or refills available) for medication(s) requested? No    Does the patient have [facility] Plus and need 100 day supply (blood pressure, diabetes and cholesterol meds only)? Patient does not have SCP

## 2023-12-19 ENCOUNTER — OFFICE VISIT (OUTPATIENT)
Dept: RHEUMATOLOGY | Facility: MEDICAL CENTER | Age: 48
End: 2023-12-19
Attending: STUDENT IN AN ORGANIZED HEALTH CARE EDUCATION/TRAINING PROGRAM
Payer: COMMERCIAL

## 2023-12-19 VITALS
SYSTOLIC BLOOD PRESSURE: 138 MMHG | TEMPERATURE: 97.5 F | HEIGHT: 62 IN | WEIGHT: 150 LBS | OXYGEN SATURATION: 97 % | BODY MASS INDEX: 27.6 KG/M2 | DIASTOLIC BLOOD PRESSURE: 102 MMHG | HEART RATE: 83 BPM

## 2023-12-19 DIAGNOSIS — M32.10 SYSTEMIC LUPUS ERYTHEMATOSUS WITH ORGAN SYSTEM INVOLVEMENT (HCC): ICD-10-CM

## 2023-12-19 DIAGNOSIS — G35 MULTIPLE SCLEROSIS (HCC): ICD-10-CM

## 2023-12-19 DIAGNOSIS — R74.8 ELEVATED LIVER ENZYMES: ICD-10-CM

## 2023-12-19 DIAGNOSIS — E55.9 VITAMIN D INSUFFICIENCY: ICD-10-CM

## 2023-12-19 PROCEDURE — 3075F SYST BP GE 130 - 139MM HG: CPT | Performed by: STUDENT IN AN ORGANIZED HEALTH CARE EDUCATION/TRAINING PROGRAM

## 2023-12-19 PROCEDURE — 99205 OFFICE O/P NEW HI 60 MIN: CPT | Performed by: STUDENT IN AN ORGANIZED HEALTH CARE EDUCATION/TRAINING PROGRAM

## 2023-12-19 PROCEDURE — 99212 OFFICE O/P EST SF 10 MIN: CPT | Performed by: STUDENT IN AN ORGANIZED HEALTH CARE EDUCATION/TRAINING PROGRAM

## 2023-12-19 PROCEDURE — 3080F DIAST BP >= 90 MM HG: CPT | Performed by: STUDENT IN AN ORGANIZED HEALTH CARE EDUCATION/TRAINING PROGRAM

## 2023-12-19 ASSESSMENT — RHEUMATOLOGY NEW PATIENT QUESTIONNAIRE
BODY ACHES: Y
TINGLING: Y
MUSCLE PAIN: Y
FATIGUE: Y
EYE PAIN: Y
DIZZINESS: Y
JOINT SWELLING: Y
JOINT PAIN: SAME WITH ACTIVITY
DIFFICULTY SWALLOWING: Y
CHARACTERISTIC: SAME WITH ACTIVITY
SPASMS: Y
WEAKNESS: Y
COLD-INDUCED COLOR CHANGES (WHITE, PURPLE, RED ON REWARMING): EARS
COLD-INDUCED COLOR CHANGES (WHITE, PURPLE, RED ON REWARMING): TOES
FEVERS: Y
RATE_1TO10: 7
CAVITIES: Y
SNORING: Y
VERTIGO: Y
NASAL ULCERS: Y
ORAL ULCERS: Y
COLD-INDUCED COLOR CHANGES (WHITE, PURPLE, RED ON REWARMING): FINGERS
SKIN PLAQUES: Y
INEFFECTIVE_MEDICATIONS: STEROIDS
HEADACHES: Y
DIFFICULTY SPEAKING: Y
CHIEF_COMPLAINT: SLE
FAST HEARTBEATS: Y
SUNLIGHT-INDUCED SKIN RASH: ARMS
SUNLIGHT-INDUCED SKIN RASH: CHEST
ANXIETY: Y
JOINT INSTABILITY: Y
TEMPLE PAIN: Y
HAIR SHEDDING: Y
PERSONAL OR EMOTIONAL STRESSORS: LIFE
BURNING: Y
DRY COUGH: Y
NAUSEA: Y
DURATION: 30-60 MINS
DRY EYES: Y
MARK ALL THE AREAS OF PAIN: 94732310
NUMBNESS: Y
BLURRY VISION: Y
TENDON PAIN: Y
DRY MOUTH: Y
SUNLIGHT-INDUCED SKIN RASH: NECK

## 2023-12-19 NOTE — PROGRESS NOTES
[facility] RHEUMATOLOGY  [address], [address], [place] [address]  Phone: [phone] ? Fax: [phone]  [facility]/Health-Services/Rheumatology    NEW PATIENT VISIT NOTE      DATE OF SERVICE: 12/19/2023         Subjective     REFERRING PRACTITIONER:  Melissa P Bloch, M.D.  [address]  [address]  [place] [address]    PATIENT IDENTIFICATION:  Della Mata  [address] [place] [address]    YOB: 1975    MEDICAL RECORD NUMBER: [id number]         CHIEF COMPLAINT:   Chief Complaint   Patient presents with    New Patient     Systemic lupus erythematosus       HISTORY OF PRESENT ILLNESS:  Della Mata is a 48 y.o. female with pertinent history notable for SLE diagnosed in 2018 (with mucocutaneous and musculoskeletal symptoms), multiple sclerosis, livedo reticularis, right shoulder dislocation s/p surgical repair, reactive depression, endometriosis and PCOS among other comorbidities. Presents to establish care for continued evaluation and management of her condition. Reports variable course including joint/muscle pain in her hands, feet, legs, and upper back, roughly 30-60 minutes of morning stiffness that varies with activity, muscle cramps on lower legs, dry eyes, dry mouth, episodic oral/nasal ulcers, photosensitive rash (on neck, chest, and arms), and fatigue with muscle weakness among multiple symptoms. She has been managing with lifestyle and dietary modifications including injections of various dietary supplements like vitamin D and feels that these have been helpful over time.  Reports other aspects of her symptoms and medical history as noted on the questionnaire below or scanned under media tab.    Pertinent treatments: Medrol Dosepak (caused paradoxical worsening of joint pain, weight, and mood disturbance).    Pertinent positive labs: Positive OSCAR 1:160 multiple nuclear dots pattern, low vitamin D of 23, and elevated AST 38 and ALT 50 with normal ALP 88 (in  12/2021).    Pertinent negative labs: Normal TSH, FT4, FT3, vitamin B12 >2000, ferritin, eGFR, creatinine, and CBC (in 12/2021 at [facility]).    [facility] [place] [facility] Form    12/19/2023 10:32 AM PST - Filed by Patient   Demographic Information   Marital Status: Single   Occupation: /CMA   Highest Level of Education: Associates   MAIN REASON FOR VISIT SLE   HISTORY OF PRESENT ILLNESS   Date of symptom onset: DX 2018   Preceding incident/ailment: DX 2018   Describe/list your symptoms: Rash, eye lesions, dry eyes, mouth sores, fevers, joint pain, swelling, muscle atrophy, muscle cramps   Exacerbating factors: Temperature, Weather   Alleviating factors: Diet, Vitamins   Helpful medications:    Ineffective medications: Steroids   Severity of pain (scale of 1-10): 7   Personal/emotional stressors: Life   Rakesh All The Areas Of Pain    REVIEW OF SYMPTOMS    General   Fevers Yes   Chills    Night sweats    Malaise    Fatigue Yes   Unintentional weight loss    Musculoskeletal   Joint pain Same with activity   Morning stiffness duration 30-60 mins   Morning stiffness characteristic Same with activity   Joint swelling Yes   Joint instability Yes   Tendon pain Yes   Muscle pain Yes   Body aches Yes   Dermatologic   Hair loss with bald spots    Hair shedding Yes   Skin thickening    Skin plaques Yes   Sunlight-induced skin rash Neck;  Chest;  Arms   Cold-induced color changes (white, purple, red on rewarming) Livedo on arms   Neurologic/Psychiatric   Weakness Yes   Spasms Yes   Tingling Yes   Burning Yes   Numbness Yes   Insomnia    Anxiety Yes   Depression    Head/Eyes   Headaches Yes   Temple pain Yes   Dizziness Yes   Dry eyes Yes   Eye pain Yes   Eye redness    Blurry vision Yes   Vision loss    Ears/Nose   Ear pain    Ringing in ears    Vertigo Yes   Hearing loss    Nasal ulcers Yes   Nosebleeds    Sinus pain    Nasal congestion    Snoring Yes   Mouth/Throat   Oral ulcers Yes   Bleeding gums    Dry  mouth Yes   Cavities Yes   Sore throat    Sticking in throat    Difficulty speaking Yes   Neck/Lymphatics   Thyroid pain    Thyroid swelling    Lymph node swelling    Cardiac/Respiratory   Chest pain with breathing    Dry cough Yes   Cough with bloody phlegm    Shortness of breath    Fast heartbeats Yes   Irregular heartbeats    Gastrointestinal   Nausea Yes   Vomiting    Difficulty swallowing Yes   Heartburn    Abdominal pain    Bloody stool    Mucus stool    Genitourinary   Pelvic pain    Genital ulcers    Abnormal discharge    Burning urination    Frothy urine    Blood in urine    MEDICAL/PERSONAL HISTORY DETAILS   Current Medical Problems: Multiple Sclerosis 2017, SLE 2018   Past/Resolved Medical Problems: Endometriosis, 1990 PCOS 2005   Surgeries/Procedures (include month/year): Tonsillectomy 1977, Appendectomy 1997, Multiple Laparoscopies with Endometriosis cleanup 1990 thru 2013, Tubal Ligation 2008, Right Shoulder Labrum Repair 2009, Complete Hysterectomy 2013.   Prescription/Over-The-Counter/Herbal Medications: Aubagio 14mg, Fluoxetine 40mg, Topamax 50mg, Sumatriptan 200mg prn, Excedrin Migraine prn, Vitamin D Injection 50,000 units Every Month   Medication/Food/Material Allergies (include reactions): Erythromycin (rash) Ampicillin (rash) Vicodin (rash) Tramadol (rash) Tape (rash)   Immunizations (include month/year) Hep B series up to date, Tetanus 2017,   Alcohol/Tobacco/Recreational Drugs: Wine glass every day, THC every day, former smoker quit 2014.   Family Medical History (father, mother, siblings only): Mother:  High cholesterol, mitral valve prolapse, vaginal cancer.  Father: Unknow     REVIEW OF SYSTEMS:  Except as noted in the history above, relevant review of systems with emphasis on autoimmune rheumatic diseases was otherwise negative.      ACTIVE PROBLEM LIST:  Patient Active Problem List    Diagnosis Date Noted    Elevated liver enzymes (AST and ALT) 12/19/2023    Vitamin D insufficiency  12/19/2023    Cough 08/18/2022    Hypogammaglobulinemia (HCC) 08/18/2022    Livedo reticularis 08/18/2022    Loss of hair 08/18/2022    Raised antibody titer 08/18/2022    Encephalitis 08/18/2022    Systemic lupus erythematosus with organ system involvement (HCC) 08/18/2022    Multiple sclerosis (HCC) 11/22/2021    Intractable migraine with aura with status migrainosus 11/22/2021    Hypertension 11/22/2021    PCOS (polycystic ovarian syndrome) 11/22/2021    Endometriosis 11/22/2021    Other forms of systemic lupus erythematosus (HCC) 11/22/2021    Irritable bowel syndrome with diarrhea 11/22/2021    Overactive bladder 11/22/2021    Reactive depression 11/22/2021       PAST MEDICAL HISTORY:  Past Medical History:   Diagnosis Date    Head ache     migraines    Lupus (HCC)     Multiple sclerosis (HCC)        PAST SURGICAL HISTORY:  Past Surgical History:   Procedure Laterality Date    ABDOMINAL HYSTERECTOMY TOTAL  2013    APPENDECTOMY  1998       MEDICATIONS:  Current Outpatient Medications   Medication Sig    amphetamine-dextroamphetamine (ADDERALL XR, 20MG,) 20 MG per XR capsule Take 1 Capsule by mouth every morning for 30 days.    Teriflunomide (AUBAGIO) 14 MG Tab Take 14 mg by mouth every day.    Teriflunomide (AUBAGIO) 14 MG Tab Take 14 mg by mouth every day.    sumatriptan (IMITREX) 100 MG tablet Take 1 Tablet by mouth one time as needed for Migraine for up to 1 dose.    tizanidine (ZANAFLEX) 4 MG Tab Take 1 Tablet by mouth every 6 hours as needed (muscle spasms).    baclofen (LIORESAL) 10 MG Tab Take 1 Tablet by mouth 3 times a day.    ondansetron (ZOFRAN) 4 MG Tab tablet Take 2 Tablets by mouth every four hours as needed for Nausea/Vomiting.    fluoxetine (PROZAC) 40 MG capsule fluoxetine 40 mg capsule daily    hydrOXYzine pamoate (VISTARIL) 25 MG Cap Take 1 Capsule by mouth 3 times a day as needed for Itching.    estradiol (ESTRACE) 0.1 MG/GM vaginal cream        ALLERGIES:   Allergies   Allergen Reactions     "Tramadol Rash    Ampicillin     Erythromycin     Hydrocodone-Acetaminophen     Tape        IMMUNIZATIONS:  Immunization History   Administered Date(s) Administered    Influenza Vac Subunit Quad Inj (Pf) 10/06/2020    Tdap Vaccine 2012       SOCIAL HISTORY:   Social History     Socioeconomic History    Marital status: Single   Tobacco Use    Smoking status: Former     Current packs/day: 0.00     Types: Cigarettes     Quit date:      Years since quittin.9    Smokeless tobacco: Never   Vaping Use    Vaping Use: Every day    Substances: THC   Substance and Sexual Activity    Alcohol use: Yes     Comment: 4-5 per week    Drug use: Yes     Types: Marijuana       FAMILY HISTORY:  Family History   Problem Relation Age of Onset    Hypertension Mother     Hyperlipidemia Mother     Lupus Paternal Grandmother             Objective     Vital Signs: BP (!) 138/102 (BP Location: Left arm, Patient Position: Sitting, BP Cuff Size: Adult)   Pulse 83   Temp 36.4 °C (97.5 °F) (Temporal)   Ht 1.575 m (5' 2\")   Wt 68 kg (150 lb)   SpO2 97% Body mass index is 27.44 kg/m².    General: Appears well and comfortable  Eyes: No scleral or conjunctival lesions  ENT: Mild punctate hyperemia in left nostril near nasal septum; no apparent oral lesions  Head/Neck: No apparent scalp or neck lesions  Cardiovascular: Regular rate and rhythm; no pericardial rubs  Respiratory: Breathing quiet and unlabored; no rales or pleural rubs  Gastrointestinal: No apparent organomegaly or abdominal masses  Integumentary: No significant cutaneous lesions or dyspigmentation  Musculoskeletal: No significant joint tenderness, periarticular soft tissue swelling, warmth, erythema, or overt synovitis; no significant restriction in range of motion of joints examined  Neurologic: No focal sensory or motor deficits  Psychiatric: Mood and affect appropriate      LABORATORY RESULTS REVIEWED AND INTERPRETED BY ME:  No loadable results found in the system. " Pertinent non-system results, if applicable, summarized under history above.      RADIOLOGY RESULTS REVIEWED AND INTERPRETED BY ME:  No loadable results found in the system. Pertinent non-system results, if applicable, summarized under history above.      All relevant laboratory and imaging results reported on this note were reviewed and interpreted by me.         Assessment & Plan     Della Mata is a 48 y.o. female with history as noted above whose presentation merits the following clinical impressions and recommendations:    1. Systemic lupus erythematosus with organ system involvement (HCC)  Clinically low disease activity despite not being on any DMARD therapy, so no urgent need for initiation of treatment at this time. Given the potential for discordance between immunologic activity and clinical disease manifestations, need to reassess her immunologic profile to gauge overall trajectory over time based on which additional recommendations will be provided.  - OSCAR REFLEXIVE PROFILE; Future  - COMPLEMENT C3; Future  - COMPLEMENT C4; Future  - COMPLEMENT TOTAL (CH50); Future  - CRP QUANTITIVE (NON-CARDIAC); Future  - Sed Rate; Future  - CBC WITH DIFFERENTIAL; Future  - Comp Metabolic Panel; Future  - MICROALBUMIN CREAT RATIO URINE; Future  - PROTEIN/CREAT RATIO URINE; Future  - IMMUNOGLOBULINS A/E/G/M, SERUM  - Consider initiation of hydroxychloroquine depending on her labs and clinical trajectory    2. Multiple sclerosis (HCC)  Clinically appears to be well-controlled on her current regimen of Aubagio under the care of neurology.  - Continue Aubagio 14 mg daily per neurology  - Routine follow-up with neurology    3. Elevated liver enzymes (AST and ALT)  Etiology unclear, but in the setting of her history of SLE, the possibility of immune-mediated hepatobiliary disease cannot be excluded.  - Comp Metabolic Panel; Future    4. Vitamin D insufficiency  Hypovitaminosis D can contribute to diffuse musculoskeletal  aches, fatigue, and malaise, so need to supplement and occasionally reassess its level.  - VITAMIN D,25 HYDROXY (DEFICIENCY); Future      The above assessment and plan were discussed with the patient who acknowledged understanding of the plan.    FOLLOW-UP: Return in about 3 months (around 3/19/2024) for Short.         Thank you for the opportunity to participate in the care of Della Mata.    Fabian Roe MD, MS, FACR  Rheumatologist, [facility] Rheumatology ? [facility]   of Clinical Medicine, Department of Internal Medicine  [facility] ? [facility]

## 2023-12-20 NOTE — PATIENT INSTRUCTIONS
AFTER VISIT INSTRUCTIONS    Below are important information to help you navigate your healthcare needs and help us serve you safely and effectively:  If laboratory tests and/or imaging studies were ordered, remember to go get them done as instructed.  If new prescriptions and/or refills were sent, remember to go pick them up from your local pharmacy, or call the specialty pharmacy to request shipment.  Always take your prescription medications exactly as prescribed unless instructed otherwise.  Note that antirheumatic drugs and steroids are immunosuppressive which means they increase your risk of infections and have multiple potential adverse effects on various organ systems in your body, though most of them are uncommon.  It is important that you are up-to-date on age-appropriate immunizations, particularly shingles and bacterial/viral pneumonia vaccines, which you can request from me or your primary care provider.  Be sure to read the drug package inserts to learn about the potential side effects of your medications before you start taking them.  If you experience any significant drug side effects, stop taking the medication and notify me promptly, and depending on the severity of the side effects, consider going to an urgent care or emergency department for immediate attention.  If there are significant findings on your lab tests and imaging studies that warrant further action, I will notify you with explanations via [employer] or phone call, otherwise you can view them on [employer] and let me know if you have any questions.  Note that [employer] messages are typically read during office hours and may take 1-7 business days before a response depending on the urgency of the situation and how busy my clinic schedule is.  In general, [employer] messaging is for non-urgent matters that do not require immediate attention, so for urgent matters that cannot wait, you are advised to go to an urgent care.  Lastly, you are granted  Pa[employer] access to my documentation of your visit and are encouraged to read my note which details my assessment and plan for your condition.  To learn more about your condition and rheumatic diseases evaluated and treated by rheumatologists, as well as gain access to many helpful resources about these diseases, visit our website at [email or web address].

## 2024-01-04 ENCOUNTER — TELEPHONE (OUTPATIENT)
Dept: NEUROLOGY | Facility: MEDICAL CENTER | Age: 49
End: 2024-01-04
Payer: COMMERCIAL

## 2024-01-04 DIAGNOSIS — G35 MULTIPLE SCLEROSIS (HCC): ICD-10-CM

## 2024-01-04 RX ORDER — TERIFLUNOMIDE 14 MG/1
14 TABLET, FILM COATED ORAL DAILY
Qty: 30 TABLET | Refills: 11 | Status: SHIPPED | OUTPATIENT
Start: 2024-01-04 | End: 2024-01-16

## 2024-01-04 NOTE — TELEPHONE ENCOUNTER
Aubagio (Teriflunomide) 14 MG Tabs    [facility] - Specialty Rx at Retail: Member/Pharmacy Call [phone] for Override Requirements Apply Surplus Limit 20 has been met/exceeded. Next Fill Payable on or after 01/13/24. The pharmacy is not in network [id number]. Refill Payable on or after 01/26/24  + - 01/04/2024 2:40pm [facility]

## 2024-01-08 DIAGNOSIS — G35 MULTIPLE SCLEROSIS (HCC): ICD-10-CM

## 2024-01-08 NOTE — TELEPHONE ENCOUNTER
Please see below       Patient comment: Adderall is no longer covered by my medication.  Generic Amphetamin/dextroamphetamine is now the preferred medication.  Thank you.

## 2024-01-09 RX ORDER — DEXTROAMPHETAMINE SACCHARATE, AMPHETAMINE ASPARTATE MONOHYDRATE, DEXTROAMPHETAMINE SULFATE AND AMPHETAMINE SULFATE 5; 5; 5; 5 MG/1; MG/1; MG/1; MG/1
20 CAPSULE, EXTENDED RELEASE ORAL EVERY MORNING
Qty: 30 CAPSULE | Refills: 0 | Status: SHIPPED | OUTPATIENT
Start: 2024-01-09 | End: 2024-02-08

## 2024-01-16 ENCOUNTER — TELEPHONE (OUTPATIENT)
Dept: NEUROLOGY | Facility: MEDICAL CENTER | Age: 49
End: 2024-01-16

## 2024-01-16 ENCOUNTER — OFFICE VISIT (OUTPATIENT)
Dept: NEUROLOGY | Facility: MEDICAL CENTER | Age: 49
End: 2024-01-16
Attending: PSYCHIATRY & NEUROLOGY
Payer: COMMERCIAL

## 2024-01-16 VITALS
HEIGHT: 62 IN | BODY MASS INDEX: 28.16 KG/M2 | HEART RATE: 98 BPM | DIASTOLIC BLOOD PRESSURE: 78 MMHG | OXYGEN SATURATION: 96 % | TEMPERATURE: 97 F | SYSTOLIC BLOOD PRESSURE: 138 MMHG | WEIGHT: 153 LBS

## 2024-01-16 DIAGNOSIS — G43.111 INTRACTABLE MIGRAINE WITH AURA WITH STATUS MIGRAINOSUS: ICD-10-CM

## 2024-01-16 DIAGNOSIS — G35 MULTIPLE SCLEROSIS (HCC): ICD-10-CM

## 2024-01-16 DIAGNOSIS — F32.9 REACTIVE DEPRESSION: ICD-10-CM

## 2024-01-16 PROCEDURE — 99212 OFFICE O/P EST SF 10 MIN: CPT | Performed by: PSYCHIATRY & NEUROLOGY

## 2024-01-16 PROCEDURE — 3075F SYST BP GE 130 - 139MM HG: CPT | Performed by: PSYCHIATRY & NEUROLOGY

## 2024-01-16 PROCEDURE — 99215 OFFICE O/P EST HI 40 MIN: CPT | Performed by: PSYCHIATRY & NEUROLOGY

## 2024-01-16 PROCEDURE — 3078F DIAST BP <80 MM HG: CPT | Performed by: PSYCHIATRY & NEUROLOGY

## 2024-01-16 RX ORDER — BACLOFEN 10 MG/1
10 TABLET ORAL 3 TIMES DAILY
Qty: 90 TABLET | Refills: 1 | Status: SHIPPED | OUTPATIENT
Start: 2024-01-16

## 2024-01-16 RX ORDER — TERIFLUNOMIDE 14 MG/1
14 TABLET, FILM COATED ORAL DAILY
Qty: 30 TABLET | Refills: 11 | Status: SHIPPED | OUTPATIENT
Start: 2024-01-16 | End: 2024-02-26 | Stop reason: SDUPTHER

## 2024-01-16 RX ORDER — HYDROXYZINE PAMOATE 25 MG/1
25 CAPSULE ORAL 3 TIMES DAILY PRN
Qty: 90 CAPSULE | Refills: 1 | Status: SHIPPED | OUTPATIENT
Start: 2024-01-16

## 2024-01-16 RX ORDER — TOPIRAMATE 25 MG/1
25 TABLET ORAL 2 TIMES DAILY
Qty: 180 TABLET | Refills: 3 | Status: SHIPPED | OUTPATIENT
Start: 2024-01-16 | End: 2025-01-10

## 2024-01-16 RX ORDER — SUMATRIPTAN 100 MG/1
100 TABLET, FILM COATED ORAL
Qty: 30 TABLET | Refills: 3 | Status: SHIPPED | OUTPATIENT
Start: 2024-01-16

## 2024-01-16 RX ORDER — CHOLECALCIFEROL (VITAMIN D3) 10(400)/ML
10 DROPS ORAL
COMMUNITY

## 2024-01-16 RX ORDER — TIZANIDINE 4 MG/1
4 TABLET ORAL EVERY 6 HOURS PRN
Qty: 60 TABLET | Refills: 3 | Status: SHIPPED | OUTPATIENT
Start: 2024-01-16

## 2024-01-16 RX ORDER — FLUOXETINE HYDROCHLORIDE 40 MG/1
CAPSULE ORAL
Qty: 90 CAPSULE | Refills: 3 | Status: SHIPPED | OUTPATIENT
Start: 2024-01-16

## 2024-01-16 NOTE — ASSESSMENT & PLAN NOTE
Pt has been having some issues with the insurance and the generic teriflunomide. Pt has some numbing in her face. Pt has had several falls and she did injure her right knee. Pt has done PT for her migraines. Pt states that that helped. Pt feels like it is getting better with home exercise. Pt wears good footwear at work and at home. Pt wears compression socks. Pt does stretching several times a day. Pt does the elliptical stairstepper at home. Pt feels like she has some good days with energy. Pt has had benefit from her B12 injections.

## 2024-01-16 NOTE — TELEPHONE ENCOUNTER
Received New Start PA request via [employer]  for Topriamate (Topamax) 25 MG Tabs. (Quantity:60, Day Supply:30) - Copay $1.25 (No PA req'd) Max 30 DS We CANNOT fill 90 DS     Insurance: [facility] ([employer])  Member ID:  [id number]  BIN: [id number]  PCN: [id number]  Group: [id number]     Ran Test claim via [place] & medication Pays for a $1.25 copay. Will outreach to patient to offer specialty pharmacy services and or release to preferred pharmacy

## 2024-01-17 NOTE — PROGRESS NOTES
Chief Complaint   Patient presents with    Follow-Up     MS       Problem List Items Addressed This Visit       Multiple sclerosis (HCC)     Pt has been having some issues with the insurance and the generic teriflunomide. Pt has some numbing in her face. Pt has had several falls and she did injure her right knee. Pt has done PT for her migraines. Pt states that that helped. Pt feels like it is getting better with home exercise. Pt wears good footwear at work and at home. Pt wears compression socks. Pt does stretching several times a day. Pt does the elliptical stairstepper at home. Pt feels like she has some good days with energy. Pt has had benefit from her B12 injections.         Relevant Medications    Teriflunomide (AUBAGIO) 14 MG Tab    baclofen (LIORESAL) 10 MG Tab    hydrOXYzine pamoate (VISTARIL) 25 MG Cap    fluoxetine (PROZAC) 40 MG capsule    tizanidine (ZANAFLEX) 4 MG Tab    Other Relevant Orders    Comp Metabolic Panel    Intractable migraine with aura with status migrainosus     Pt needs refills of the topamax and imitrex         Relevant Medications    sumatriptan (IMITREX) 100 MG tablet    baclofen (LIORESAL) 10 MG Tab    fluoxetine (PROZAC) 40 MG capsule    tizanidine (ZANAFLEX) 4 MG Tab    topiramate (TOPAMAX) 25 MG Tab    Reactive depression     Pt needs refills of her prozac and adderall.         Relevant Medications    hydrOXYzine pamoate (VISTARIL) 25 MG Cap    fluoxetine (PROZAC) 40 MG capsule       History of present illness:  Della Mata 48 y.o. female presents today for MS, migraines and depression.    Past medical history:   Past Medical History:   Diagnosis Date    Head ache     migraines    Lupus (HCC)     Multiple sclerosis (HCC)        Past surgical history:   Past Surgical History:   Procedure Laterality Date    ABDOMINAL HYSTERECTOMY TOTAL  2013    APPENDECTOMY  1998       Family history:   Family History   Problem Relation Age of Onset    Hypertension Mother     Hyperlipidemia  Mother     Lupus Paternal Grandmother        Social history:   Social History     Socioeconomic History    Marital status: Single     Spouse name: Not on file    Number of children: Not on file    Years of education: Not on file    Highest education level: Not on file   Occupational History    Not on file   Tobacco Use    Smoking status: Former     Current packs/day: 0.00     Types: Cigarettes     Quit date: 2014     Years since quitting: 10.0    Smokeless tobacco: Never   Vaping Use    Vaping Use: Every day    Substances: THC, CBD    Devices: Disposable, Pre-filled or refillable cartridge, Refillable tank, Pre-filled pod   Substance and Sexual Activity    Alcohol use: Yes     Comment: 4-5 per week    Drug use: Yes     Types: Marijuana, Inhaled    Sexual activity: Not on file   Other Topics Concern    Not on file   Social History Narrative    Not on file     Social Determinants of Health     Financial Resource Strain: Not on file   Food Insecurity: Not on file   Transportation Needs: Not on file   Physical Activity: Not on file   Stress: Not on file   Social Connections: Not on file   Intimate Partner Violence: Not on file   Housing Stability: Not on file       Current medications:   Current Outpatient Medications   Medication    Cholecalciferol (VITAMIN D3) 10 MCG/ML Liquid    Teriflunomide (AUBAGIO) 14 MG Tab    sumatriptan (IMITREX) 100 MG tablet    baclofen (LIORESAL) 10 MG Tab    hydrOXYzine pamoate (VISTARIL) 25 MG Cap    fluoxetine (PROZAC) 40 MG capsule    tizanidine (ZANAFLEX) 4 MG Tab    topiramate (TOPAMAX) 25 MG Tab    amphetamine-dextroamphetamine (ADDERALL XR, 20MG,) 20 MG per XR capsule    ondansetron (ZOFRAN) 4 MG Tab tablet    estradiol (ESTRACE) 0.1 MG/GM vaginal cream     Current Facility-Administered Medications   Medication Dose    ondansetron (ZOFRAN ODT) dispertab 8 mg  8 mg       Medication Allergy:  Allergies   Allergen Reactions    Tramadol Rash    Ampicillin     Erythromycin      "Hydrocodone-Acetaminophen     Tape        Review of systems:   Constitutional: denies fever, night sweats, weight loss.   Eyes: denies acute vision change, eye pain or secretion.   Ears, Nose, Mouth, Throat: denies nasal secretion, nasal bleeding, difficulty swallowing, hearing loss, tinnitus, vertigo, ear pain, acute dental problems, oral ulcers or lesions.   Endocrine: denies recent weight changes, heat or cold intolerance, polyuria, polydypsia, polyphagia,abnormal hair growth.  Cardiovascular: denies new onset of chest pain, palpitations, syncope, or dyspnea of exertion.  Pulmonary: denies shortness of breath, new onset of cough, hemoptysis, wheezing, chest pain or flu-like symptoms.   GI: denies nausea, vomiting, diarrhea, GI bleeding, change in appetite, abdominal pain, and change in bowel habits.  : denies dysuria, urinary incontinence, hematuria.  Heme/oncology: denies history of easy bruising or bleeding. No history of cancer, DVTor PE.  Allergy/immunology: denies hives/urticaria, or itching.   Dermatologic: denies new rash, or new skin lesions.  Musculoskeletal:positve knee pain, muscle pain, neck and back pain. Neurologic: denies headaches, acute visual changes, facial droopiness, muscle weakness (focal or generalized), paresthesias, anesthesia, ataxia, change in speech or language, memory loss, abnormal movements, seizures, loss of consciousness, or episodes of confusion.   Psychiatric: denies symptoms of depression, anxiety, hallucinations, mood swings or changes, suicidal or homicidal thoughts.     Physical examination:   Vitals:    01/16/24 1259   BP: 138/78   BP Location: Left arm   Patient Position: Sitting   BP Cuff Size: Adult   Pulse: 98   Temp: 36.1 °C (97 °F)   TempSrc: Temporal   SpO2: 96%   Weight: 69.4 kg (153 lb)   Height: 1.575 m (5' 2.01\")     General: Patient in no acute distress, pleasant and cooperative.  HEENT: Normocephalic, no signs of acute trauma.   Neck: supple, no meningeal " signs or carotid bruits. There is normal range of motion. No tenderness on exam.   Chest: clear to auscultation. No cough.   CV: RRR, no murmurs.   Skin: no signs of acute rashes or trauma.   Musculoskeletal: joints exhibit full range of motion, without any pain to palpation. There are no signs of joint or muscle swelling. There is no tenderness to deep palpation of muscles.   Psychiatric: No hallucinatory behavior. Denies symptoms of depression or suicidal ideation. Mood and affect appear normal on exam.     NEUROLOGICAL EXAM:   Mental status, orientation: Awake, alert and fully oriented.   Speech and language: speech is clear and fluent. The patient is able to name, repeat and comprehend.   Memory: There is intact recollection of recent and remote events.   Cranial nerve exam: Pupils are 3-4 mm bilaterally and equally reactive to light and accommodation. Visual fields are intact by confrontation. Fundoscopic exam was unremarkable. There is no nystagmus on primary or secondary gaze. Intact full EOM in all directions of gaze. Face appears symmetric. Sensation in the face is intact to light touch. Uvula is midline. Palate elevates symmetrically. Tongue is midline and without any signs of tongue biting or fasciculations. Sternocleidomastoid muscles exhibit is normal strength bilaterally. Shoulder shrug is intact bilaterally.   Motor exam: Strength is 5/5 in all extremities. Tone is normal. No abnormal movements were seen on exam.   Sensory exam reveals normal sense of light touch, proprioception, vibration and pinprick in all extremities.   Deep tendon reflexes:  2+ throughout. Plantar responses are flexor. There is no clonus.   Coordination: shows a normal finger-nose-finger. Normal rapidly alternating movements.   Gait: The patient was able to get up from seated position on first attempt without requiring assistance. Found to be steady when walking. Movements were fluid with normal arm swing. The patient was able to  turn without difficulties or tendency to fall. Romberg examination positive      ANCILLARY DATA REVIEWED:     Lab Data Review:  No results found for this or any previous visit (from the past 24 hour(s)).    Records reviewed: labs and records reviewed      Imaging: last MRI scans reviewed          ASSESSMENT AND PLAN:    1. Multiple sclerosis (HCC)  Refill generic teriflunomide which she has been off because of insurance issues with [place].  - Teriflunomide (AUBAGIO) 14 MG Tab; Take 14 mg by mouth every day.  Dispense: 30 Tablet; Refill: 11  - baclofen (LIORESAL) 10 MG Tab; Take 1 Tablet by mouth 3 times a day.  Dispense: 90 Tablet; Refill: 1  - hydrOXYzine pamoate (VISTARIL) 25 MG Cap; Take 1 Capsule by mouth 3 times a day as needed for Itching.  Dispense: 90 Capsule; Refill: 1  - fluoxetine (PROZAC) 40 MG capsule; fluoxetine 40 mg capsule daily  Dispense: 90 Capsule; Refill: 3  - tizanidine (ZANAFLEX) 4 MG Tab; Take 1 Tablet by mouth every 6 hours as needed (muscle spasms).  Dispense: 60 Tablet; Refill: 3  - Comp Metabolic Panel; Future    2. Intractable migraine with aura with status migrainosus  Refill as these medications help her headaches  - sumatriptan (IMITREX) 100 MG tablet; Take 1 Tablet by mouth one time as needed for Migraine for up to 1 dose.  Dispense: 30 Tablet; Refill: 3  - topiramate (TOPAMAX) 25 MG Tab; Take 1 Tablet by mouth 2 times a day for 360 days.  Dispense: 180 Tablet; Refill: 3    3. Reactive depression  Refill as it is helping  - fluoxetine (PROZAC) 40 MG capsule; fluoxetine 40 mg capsule daily  Dispense: 90 Capsule; Refill: 3        FOLLOW-UP:   Return in about 6 months (around 7/16/2024).      My total time spent caring for the patient on the day of the encounter was 47 minutes.   This does not include time spent on separately billable procedures/tests.     EDUCATION AND COUNSELING:  -Discussed regular exercise program and prevention of cardiovascular disease, including stroke.   -Discussed  healthy lifestyle, including: healthy diet (rich in fruits, vegetables, nuts and healthy oils); proper hydration, and adequate sleep hygiene (allowing 7-8 hrs of overnight sleep).        Melissa Bloch, MD  Clinical  of Neurology [facility].   Diplomate in Neurology.   Office: [phone]  Fax: [phone]

## 2024-02-09 ENCOUNTER — PATIENT MESSAGE (OUTPATIENT)
Dept: NEUROLOGY | Facility: MEDICAL CENTER | Age: 49
End: 2024-02-09
Payer: COMMERCIAL

## 2024-02-09 DIAGNOSIS — G35 MULTIPLE SCLEROSIS (HCC): ICD-10-CM

## 2024-02-13 ENCOUNTER — TELEPHONE (OUTPATIENT)
Dept: NEUROLOGY | Facility: MEDICAL CENTER | Age: 49
End: 2024-02-13
Payer: COMMERCIAL

## 2024-02-26 ENCOUNTER — TELEPHONE (OUTPATIENT)
Dept: NEUROLOGY | Facility: MEDICAL CENTER | Age: 49
End: 2024-02-26
Payer: COMMERCIAL

## 2024-02-26 RX ORDER — TERIFLUNOMIDE 14 MG/1
14 TABLET, FILM COATED ORAL DAILY
Qty: 90 TABLET | Refills: 3 | Status: SHIPPED | OUTPATIENT
Start: 2024-02-26 | End: 2025-02-20

## 2024-02-26 NOTE — PATIENT COMMUNICATION
Patient wants the RX to be sent to [facility] because she is having issues with optum

## 2024-02-26 NOTE — TELEPHONE ENCOUNTER
Teriflunomide (Aubagio) 14 MG Tabs    [place] prev, end date 10/04/2024 we CANNOT fill, will have liaison release to patient pharmacy of record. - 02/26/2024 2:10pm Tammy

## 2024-03-16 ASSESSMENT — RHEUMATOLOGY FOLLOW-UP QUESTIONNAIRE
MARK ALL THE AREAS OF PAIN: 98571814
CHIEF_COMPLAINT: FOLLOW UP

## 2024-03-18 ASSESSMENT — RHEUMATOLOGY FOLLOW-UP QUESTIONNAIRE
CHIEF_COMPLAINT: FOLLOW UP
MARK ALL THE AREAS OF PAIN: 98571814

## 2024-03-19 ENCOUNTER — APPOINTMENT (OUTPATIENT)
Dept: RHEUMATOLOGY | Facility: MEDICAL CENTER | Age: 49
End: 2024-03-19
Attending: STUDENT IN AN ORGANIZED HEALTH CARE EDUCATION/TRAINING PROGRAM
Payer: COMMERCIAL

## 2024-04-09 ENCOUNTER — APPOINTMENT (OUTPATIENT)
Dept: RHEUMATOLOGY | Facility: MEDICAL CENTER | Age: 49
End: 2024-04-09
Payer: COMMERCIAL

## 2024-04-09 VITALS
TEMPERATURE: 96.6 F | WEIGHT: 156 LBS | HEART RATE: 101 BPM | SYSTOLIC BLOOD PRESSURE: 126 MMHG | OXYGEN SATURATION: 96 % | HEIGHT: 62 IN | BODY MASS INDEX: 28.71 KG/M2 | DIASTOLIC BLOOD PRESSURE: 88 MMHG

## 2024-04-09 DIAGNOSIS — M32.10 SYSTEMIC LUPUS ERYTHEMATOSUS WITH ORGAN SYSTEM INVOLVEMENT (HCC): ICD-10-CM

## 2024-04-09 DIAGNOSIS — G35 MULTIPLE SCLEROSIS (HCC): ICD-10-CM

## 2024-04-09 DIAGNOSIS — R76.8 LOW IMMUNOGLOBULIN LEVEL: ICD-10-CM

## 2024-04-09 PROCEDURE — 3074F SYST BP LT 130 MM HG: CPT | Performed by: STUDENT IN AN ORGANIZED HEALTH CARE EDUCATION/TRAINING PROGRAM

## 2024-04-09 PROCEDURE — 99214 OFFICE O/P EST MOD 30 MIN: CPT | Performed by: STUDENT IN AN ORGANIZED HEALTH CARE EDUCATION/TRAINING PROGRAM

## 2024-04-09 PROCEDURE — 3079F DIAST BP 80-89 MM HG: CPT | Performed by: STUDENT IN AN ORGANIZED HEALTH CARE EDUCATION/TRAINING PROGRAM

## 2024-04-09 PROCEDURE — 99212 OFFICE O/P EST SF 10 MIN: CPT | Performed by: STUDENT IN AN ORGANIZED HEALTH CARE EDUCATION/TRAINING PROGRAM

## 2024-04-09 NOTE — PATIENT INSTRUCTIONS
AFTER VISIT INSTRUCTIONS    Below are important information to help you navigate your healthcare needs and help us serve you safely and effectively:  If laboratory tests and/or imaging studies were ordered, remember to go get them done as instructed.  If new prescriptions and/or refills were sent, remember to go pick them up from your local pharmacy, or call the specialty pharmacy to request shipment.  Always take your prescription medications exactly as prescribed unless instructed otherwise.  Note that antirheumatic drugs and steroids are immunosuppressive which means they increase your risk of infections and have multiple potential adverse effects on various organ systems in your body, though many of them are uncommon.  It is important that you are up-to-date on age-appropriate immunizations, particularly shingles and bacterial/viral pneumonia vaccines, which you can request from me or your primary care provider.  Be sure to read the drug package inserts to learn about the potential side effects of your medications before you start taking them.  If you experience any significant drug side effects, stop taking the medication and notify me promptly, and depending on the severity of the side effects, consider going to an urgent care or emergency department for immediate attention.  If there are significant findings on your lab tests and imaging studies that warrant further action, I will notify you with explanations via [employer] or phone call, otherwise you can view them on [employer] and let me know if you have any questions.  Note that [employer] messages are typically read during office hours and may take 1-7 business days before a response depending on the urgency of the situation and how busy my clinic schedule is.  In general, [employer] messaging is for non-urgent matters that do not require immediate attention, so for urgent matters that cannot wait, you are advised to go to an urgent care.  You are granted [employer]  access to my documentation of your visit and are encouraged to read my note which details my assessment and plan for your condition.  To learn more about your condition and rheumatic diseases evaluated and treated by rheumatologists, as well as gain access to many helpful resources about these diseases, visit our website: [email or web address].  To properly dispose of your unused, unwanted, or residual medications/supplies, visit the Drug Enforcement Administration website to locate your closest drop-off location: www.[place].gov/everyday-takeback-day.

## 2024-04-09 NOTE — PROGRESS NOTES
[facility]HE[facility]  [address], [address], [place] [address]  Phone: [phone] ? Fax: [phone]  [facility]/Health-Services/Rheumatology    FOLLOW-UP VISIT NOTE      DATE OF SERVICE: 04/09/2024         Subjective     PRIMARY CARE PRACTITIONER:  Pcp Pt States None  No address on file    PATIENT IDENTIFICATION:  Della Mata  [address] [place] [address]    YOB: 1975    MEDICAL RECORD NUMBER: [id number]          CHIEF COMPLAINT:   Chief Complaint   Patient presents with    Follow-Up     Systemic lupus erythematosus with organ system involvement (HCC)       RHEUMATOLOGIC HISTORY:  Della Mata is a 48 y.o. female with pertinent history notable for SLE diagnosed in 2018 (with mucocutaneous and musculoskeletal symptoms), multiple sclerosis, livedo reticularis, right shoulder dislocation s/p surgical repair, reactive depression, endometriosis and PCOS among other comorbidities. Previously under the care of a rheumatologist in [place], she initially presented on 12/19/23 to establish care for continued evaluation and management of her condition. Reported variable course of symptoms including joint/muscle pain in her hands, feet, legs, and upper back, roughly 30-60 minutes of morning stiffness that varies with activity, muscle cramps on lower legs, dry eyes, dry mouth, episodic oral/nasal ulcers, photosensitive rash (on neck, chest, and arms), and fatigue with muscle weakness among multiple symptoms. She had been managing with lifestyle and dietary modifications including injections of various dietary supplements like vitamin D and felt that these had been helpful over time. Reported other aspects of her symptoms and medical history as noted on the initial visit questionnaire.     Pertinent treatments: Aubagio (teriflunomide) 14 mg daily for MS (on/off 1/2019-present), Medrol Dosepak (in 7/2022, caused paradoxical worsening of joint pain, weight, and mood disturbance),  hydroxychloroquine (in 2018, self-discontinued after 1 month due to perceived ineffectiveness).     Pertinent positive labs: Positive OSCAR 1:640<1:160 nuclear dots pattern and 1:40 homogenous pattern (in 12/2023<12/2021), and low IgG 509 with normal IgM/A (in 12/2023).     Pertinent negative labs: Normal TSH, FT4, FT3, vitamin B12 >2000, and ferritin (in 12/2021 at [facility]), MACR, UPCR, C3, C4, CH50 >60, CRP, ESR, vitamin D, eGFR, creatinine, LFTs, and acceptable CBC (in 12/2023 at [facility]).      INTERVAL HISTORY:  Reports interval history as noted on the questionnaire below or scanned under media tab.  Notably due to insurance issues, she has not been able to get a refill of teriflunomide for her MS for over 3 months now.  Since 1.5 months ago she has had increasing joint pain in her thumbs, elbows, knees, and ankles/heels worse with activity.  She has also experienced variable degrees of her previously reported symptoms as noted on the rheumatologic history.  Planning to go follow-up with her neurologist to ask about refill of teriflunomide.    REVIEW OF SYSTEMS:  Except as noted in the history above, relevant review of systems with emphasis on autoimmune rheumatic diseases was otherwise negative.      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/09/2024    Elevated liver enzymes (AST and ALT) 12/19/2023    Vitamin D insufficiency 12/19/2023    Cough 08/18/2022    Hypogammaglobulinemia (HCC) 08/18/2022    Livedo reticularis 08/18/2022    Loss of hair 08/18/2022    Raised antibody titer 08/18/2022    Encephalitis 08/18/2022    Systemic lupus erythematosus with organ system involvement (HCC) 08/18/2022    Multiple sclerosis (HCC) 11/22/2021    Intractable migraine with aura with status migrainosus 11/22/2021    Hypertension 11/22/2021    PCOS (polycystic ovarian syndrome) 11/22/2021    Endometriosis 11/22/2021    Other forms of systemic lupus erythematosus  (HCC) 11/22/2021    Irritable bowel syndrome with diarrhea 11/22/2021    Overactive bladder 11/22/2021    Reactive depression 11/22/2021       PAST MEDICAL HISTORY:  Past Medical History:   Diagnosis Date    Head ache     migraines    Lupus (HCC)     Multiple sclerosis (HCC)        PAST SURGICAL HISTORY:  Past Surgical History:   Procedure Laterality Date    ABDOMINAL HYSTERECTOMY TOTAL  2013    APPENDECTOMY  1998       SOCIAL HISTORY:   Social History     Socioeconomic History    Marital status: Single   Tobacco Use    Smoking status: Former     Current packs/day: 0.00     Types: Cigarettes     Quit date: 2014     Years since quitting: 10.2    Smokeless tobacco: Never   Vaping Use    Vaping Use: Every day    Substances: THC, CBD    Devices: Disposable, Pre-filled or refillable cartridge, Refillable tank, Pre-filled pod   Substance and Sexual Activity    Alcohol use: Yes     Comment: 4-5 per week    Drug use: Yes     Types: Marijuana, Inhaled       FAMILY HISTORY:  Family History   Problem Relation Age of Onset    Hypertension Mother     Hyperlipidemia Mother     Lupus Paternal Grandmother        MEDICATIONS:  Current Outpatient Medications   Medication Sig    Cholecalciferol (VITAMIN D3) 10 MCG/ML Liquid Inject 10 mL as directed every 30 (thirty) days.    sumatriptan (IMITREX) 100 MG tablet Take 1 Tablet by mouth one time as needed for Migraine for up to 1 dose.    baclofen (LIORESAL) 10 MG Tab Take 1 Tablet by mouth 3 times a day.    hydrOXYzine pamoate (VISTARIL) 25 MG Cap Take 1 Capsule by mouth 3 times a day as needed for Itching.    fluoxetine (PROZAC) 40 MG capsule fluoxetine 40 mg capsule daily    tizanidine (ZANAFLEX) 4 MG Tab Take 1 Tablet by mouth every 6 hours as needed (muscle spasms).    topiramate (TOPAMAX) 25 MG Tab Take 1 Tablet by mouth 2 times a day for 360 days.    ondansetron (ZOFRAN) 4 MG Tab tablet Take 2 Tablets by mouth every four hours as needed for Nausea/Vomiting.    estradiol (ESTRACE)  "0.1 MG/GM vaginal cream     Teriflunomide (AUBAGIO) 14 MG Tab Take 14 mg by mouth every day for 360 days. (Patient not taking: Reported on 4/9/2024)       ALLERGIES:   Allergies   Allergen Reactions    Tramadol Rash    Ampicillin     Erythromycin     Hydrocodone-Acetaminophen     Tape        IMMUNIZATIONS:  Immunization History   Administered Date(s) Administered    Influenza Vac Subunit Quad Inj (Pf) 10/06/2020    Tdap Vaccine 11/12/2012            Objective     Vital Signs: /88 (BP Location: Left arm, Patient Position: Sitting, BP Cuff Size: Adult)   Pulse (!) 101   Temp 35.9 °C (96.6 °F) (Temporal)   Ht 1.575 m (5' 2\")   Wt 70.8 kg (156 lb)   SpO2 96% Body mass index is 28.53 kg/m².    General: Appears well and comfortable  Eyes: No scleral or conjunctival lesions  ENT: No apparent oral, nasal, or ear lesions  Head/Neck: No apparent scalp or neck lesions  Cardiovascular: Regular rate and rhythm  Respiratory: Breathing quiet and unlabored  Gastrointestinal: No apparent organomegaly or abdominal masses  Integumentary: No significant cutaneous lesions or dyspigmentation  Musculoskeletal: No significant joint tenderness, periarticular soft tissue swelling, warmth, erythema, or overt synovitis; no significant restriction in range of motion of joints examined  Neurologic: No focal sensory or motor deficits  Psychiatric: Mood and affect appropriate      LABORATORY RESULTS REVIEWED AND INTERPRETED BY ME:  No loadable results found in the system. Pertinent non-system results, if applicable, summarized under history above.      RADIOLOGY RESULTS REVIEWED AND INTERPRETED BY ME:  No loadable results found in the system. Pertinent non-system results, if applicable, summarized under history above.      All relevant laboratory and imaging results reported on this note were reviewed and interpreted by me.         Assessment & Plan     Della Mata is a 48 y.o. female with history and physical as noted above whose " presentation merits the following clinical impressions and recommendations:    1. Systemic lupus erythematosus with organ system involvement (HCC)  Clinically without objective evidence of significant disease activity. Nonetheless, her reported increase of musculoskeletal symptoms after running out of Aubagio for her MS raises some concern. Reasonable at this time to reassess markers of disease activity based on which additional recommendations may be provided.  - Repeat OSCAR panel, C3, C4, CH50, CRP and ESR (previously ordered)  - Consider reinitiation of hydroxychloroquine depending on her lab results and clinical trajectory    2. Multiple sclerosis (HCC)  Clinically appears to remain stable despite being off of Aubagio for over 2 months now, but need to resume this medication as soon as possible to prevent disease flare.  - Resume Aubagio 14 mg daily as soon as received  - Routine follow-up with neurology    3. Low immunoglobulin IgG level  Secondary phenomenon of unclear etiology, so routine monitoring would be reasonable.  - Consider repeat testing depending on her clinical trajectory      The above assessment and plan were discussed with the patient who acknowledged understanding of the plan.    FOLLOW-UP: Return in about 4 months (around 8/9/2024) for Short.         Thank you for the opportunity to participate in the care of Jbkrystle BOO Esperanza.    Fabian Roe MD, MS, FACR  Rheumatologist, [facility] Rheumatology ? [facility]   of Clinical Medicine, Department of Internal Medicine  Formerly [facility] ? [facility]

## 2024-04-18 ENCOUNTER — HOSPITAL ENCOUNTER (OUTPATIENT)
Dept: LAB | Facility: MEDICAL CENTER | Age: 49
End: 2024-04-18
Attending: STUDENT IN AN ORGANIZED HEALTH CARE EDUCATION/TRAINING PROGRAM
Payer: COMMERCIAL

## 2024-04-18 DIAGNOSIS — M32.10 SYSTEMIC LUPUS ERYTHEMATOSUS WITH ORGAN SYSTEM INVOLVEMENT (HCC): ICD-10-CM

## 2024-04-18 DIAGNOSIS — E55.9 VITAMIN D INSUFFICIENCY: ICD-10-CM

## 2024-04-18 DIAGNOSIS — R74.8 ELEVATED LIVER ENZYMES: ICD-10-CM

## 2024-04-18 LAB
25(OH)D3 SERPL-MCNC: 41 NG/ML (ref 30–100)
ALBUMIN SERPL BCP-MCNC: 4.9 G/DL (ref 3.2–4.9)
ALBUMIN/GLOB SERPL: 2.1 G/DL
ALP SERPL-CCNC: 99 U/L (ref 30–99)
ALT SERPL-CCNC: 23 U/L (ref 2–50)
ANION GAP SERPL CALC-SCNC: 15 MMOL/L (ref 7–16)
AST SERPL-CCNC: 27 U/L (ref 12–45)
BASOPHILS # BLD AUTO: 0.8 % (ref 0–1.8)
BASOPHILS # BLD: 0.07 K/UL (ref 0–0.12)
BILIRUB SERPL-MCNC: 0.2 MG/DL (ref 0.1–1.5)
BUN SERPL-MCNC: 10 MG/DL (ref 8–22)
C3 SERPL-MCNC: 139.8 MG/DL (ref 87–200)
C4 SERPL-MCNC: 29.1 MG/DL (ref 19–52)
CALCIUM ALBUM COR SERPL-MCNC: 9 MG/DL (ref 8.5–10.5)
CALCIUM SERPL-MCNC: 9.7 MG/DL (ref 8.5–10.5)
CHLORIDE SERPL-SCNC: 102 MMOL/L (ref 96–112)
CO2 SERPL-SCNC: 19 MMOL/L (ref 20–33)
CREAT SERPL-MCNC: 0.8 MG/DL (ref 0.5–1.4)
CRP SERPL HS-MCNC: <0.3 MG/DL (ref 0–0.75)
EOSINOPHIL # BLD AUTO: 0.26 K/UL (ref 0–0.51)
EOSINOPHIL NFR BLD: 3 % (ref 0–6.9)
ERYTHROCYTE [DISTWIDTH] IN BLOOD BY AUTOMATED COUNT: 42.2 FL (ref 35.9–50)
ERYTHROCYTE [SEDIMENTATION RATE] IN BLOOD BY WESTERGREN METHOD: 3 MM/HOUR (ref 0–25)
GFR SERPLBLD CREATININE-BSD FMLA CKD-EPI: 90 ML/MIN/1.73 M 2
GLOBULIN SER CALC-MCNC: 2.3 G/DL (ref 1.9–3.5)
GLUCOSE SERPL-MCNC: 77 MG/DL (ref 65–99)
HCT VFR BLD AUTO: 43.9 % (ref 37–47)
HGB BLD-MCNC: 15.3 G/DL (ref 12–16)
IMM GRANULOCYTES # BLD AUTO: 0.03 K/UL (ref 0–0.11)
IMM GRANULOCYTES NFR BLD AUTO: 0.3 % (ref 0–0.9)
LYMPHOCYTES # BLD AUTO: 2.27 K/UL (ref 1–4.8)
LYMPHOCYTES NFR BLD: 26.3 % (ref 22–41)
MCH RBC QN AUTO: 31.7 PG (ref 27–33)
MCHC RBC AUTO-ENTMCNC: 34.9 G/DL (ref 32.2–35.5)
MCV RBC AUTO: 90.9 FL (ref 81.4–97.8)
MONOCYTES # BLD AUTO: 0.53 K/UL (ref 0–0.85)
MONOCYTES NFR BLD AUTO: 6.1 % (ref 0–13.4)
NEUTROPHILS # BLD AUTO: 5.46 K/UL (ref 1.82–7.42)
NEUTROPHILS NFR BLD: 63.5 % (ref 44–72)
NRBC # BLD AUTO: 0 K/UL
NRBC BLD-RTO: 0 /100 WBC (ref 0–0.2)
PLATELET # BLD AUTO: 401 K/UL (ref 164–446)
PMV BLD AUTO: 9.6 FL (ref 9–12.9)
POTASSIUM SERPL-SCNC: 4 MMOL/L (ref 3.6–5.5)
PROT SERPL-MCNC: 7.2 G/DL (ref 6–8.2)
RBC # BLD AUTO: 4.83 M/UL (ref 4.2–5.4)
SODIUM SERPL-SCNC: 136 MMOL/L (ref 135–145)
WBC # BLD AUTO: 8.6 K/UL (ref 4.8–10.8)

## 2024-04-18 PROCEDURE — 86162 COMPLEMENT TOTAL (CH50): CPT

## 2024-04-18 PROCEDURE — 85652 RBC SED RATE AUTOMATED: CPT

## 2024-04-18 PROCEDURE — 86160 COMPLEMENT ANTIGEN: CPT | Mod: 91

## 2024-04-18 PROCEDURE — 82306 VITAMIN D 25 HYDROXY: CPT

## 2024-04-18 PROCEDURE — 80053 COMPREHEN METABOLIC PANEL: CPT

## 2024-04-18 PROCEDURE — 36415 COLL VENOUS BLD VENIPUNCTURE: CPT

## 2024-04-18 PROCEDURE — 86038 ANTINUCLEAR ANTIBODIES: CPT

## 2024-04-18 PROCEDURE — 86140 C-REACTIVE PROTEIN: CPT

## 2024-04-18 PROCEDURE — 85025 COMPLETE CBC W/AUTO DIFF WBC: CPT

## 2024-04-20 LAB
CH50 SERPL-ACNC: 71 U/ML (ref 38.7–89.9)
NUCLEAR IGG SER QL IA: NORMAL

## 2024-06-11 DIAGNOSIS — G35 MULTIPLE SCLEROSIS (HCC): ICD-10-CM

## 2024-07-16 ENCOUNTER — APPOINTMENT (OUTPATIENT)
Dept: NEUROLOGY | Facility: MEDICAL CENTER | Age: 49
End: 2024-07-16
Attending: PSYCHIATRY & NEUROLOGY
Payer: COMMERCIAL

## 2024-08-12 ENCOUNTER — APPOINTMENT (OUTPATIENT)
Dept: RHEUMATOLOGY | Facility: MEDICAL CENTER | Age: 49
End: 2024-08-12
Attending: STUDENT IN AN ORGANIZED HEALTH CARE EDUCATION/TRAINING PROGRAM
Payer: COMMERCIAL